# Patient Record
Sex: MALE | Race: WHITE | NOT HISPANIC OR LATINO | Employment: PART TIME | URBAN - METROPOLITAN AREA
[De-identification: names, ages, dates, MRNs, and addresses within clinical notes are randomized per-mention and may not be internally consistent; named-entity substitution may affect disease eponyms.]

---

## 2023-10-20 ENCOUNTER — TELEPHONE (OUTPATIENT)
Dept: PEDIATRIC NEUROLOGY | Facility: HOSPITAL | Age: 23
End: 2023-10-20
Payer: COMMERCIAL

## 2023-10-20 DIAGNOSIS — F90.2 ATTENTION DEFICIT HYPERACTIVITY DISORDER (ADHD), COMBINED TYPE: ICD-10-CM

## 2023-10-20 RX ORDER — METHYLPHENIDATE HYDROCHLORIDE 36 MG/1
72 TABLET ORAL DAILY
COMMUNITY
End: 2023-10-20 | Stop reason: SDUPTHER

## 2023-10-20 RX ORDER — METHYLPHENIDATE HYDROCHLORIDE 36 MG/1
72 TABLET ORAL DAILY
Qty: 60 TABLET | Refills: 0 | Status: SHIPPED | OUTPATIENT
Start: 2023-10-20 | End: 2024-01-23 | Stop reason: SDUPTHER

## 2023-10-20 RX ORDER — METHYLPHENIDATE HYDROCHLORIDE 36 MG/1
72 TABLET ORAL DAILY
Qty: 60 TABLET | Refills: 0 | Status: SHIPPED | OUTPATIENT
Start: 2023-12-21 | End: 2024-02-21 | Stop reason: SDUPTHER

## 2023-10-20 RX ORDER — METHYLPHENIDATE HYDROCHLORIDE 36 MG/1
72 TABLET ORAL DAILY
Qty: 60 TABLET | Refills: 0 | Status: SHIPPED | OUTPATIENT
Start: 2023-11-20 | End: 2024-02-21 | Stop reason: SDUPTHER

## 2023-10-20 NOTE — TELEPHONE ENCOUNTER
Rx Refill Request Telephone Encounter  Name:  John Chandler  :  [id number]  Medication Name:  Methylphenidate HCI ER  36 MG oral tab take 2 tabs daily   Specific Pharmacy location:  [address]   Date of last appointment:  2023

## 2023-12-02 DIAGNOSIS — F41.9 ANXIETY DISORDER, UNSPECIFIED: ICD-10-CM

## 2023-12-02 RX ORDER — ARIPIPRAZOLE 5 MG/1
5 TABLET ORAL DAILY
Qty: 90 TABLET | Refills: 1 | Status: SHIPPED | OUTPATIENT
Start: 2023-12-02 | End: 2024-05-28

## 2023-12-11 DIAGNOSIS — F90.9 ATTENTION-DEFICIT HYPERACTIVITY DISORDER, UNSPECIFIED TYPE: ICD-10-CM

## 2023-12-11 RX ORDER — GUANFACINE 2 MG/1
2 TABLET, EXTENDED RELEASE ORAL DAILY
Qty: 90 TABLET | Refills: 1 | Status: SHIPPED | OUTPATIENT
Start: 2023-12-11

## 2023-12-16 DIAGNOSIS — F41.9 ANXIETY DISORDER, UNSPECIFIED: ICD-10-CM

## 2023-12-18 RX ORDER — FLUOXETINE HYDROCHLORIDE 40 MG/1
40 CAPSULE ORAL DAILY
Qty: 90 CAPSULE | Refills: 1 | Status: SHIPPED | OUTPATIENT
Start: 2023-12-18 | End: 2024-04-15

## 2024-01-12 DIAGNOSIS — G47.9 SLEEP DISORDER, UNSPECIFIED: ICD-10-CM

## 2024-01-12 RX ORDER — CLONIDINE HYDROCHLORIDE 0.2 MG/1
0.2 TABLET ORAL NIGHTLY
Qty: 90 TABLET | Refills: 1 | Status: SHIPPED | OUTPATIENT
Start: 2024-01-12

## 2024-01-23 DIAGNOSIS — F90.2 ATTENTION DEFICIT HYPERACTIVITY DISORDER (ADHD), COMBINED TYPE: ICD-10-CM

## 2024-01-23 RX ORDER — METHYLPHENIDATE HYDROCHLORIDE 36 MG/1
72 TABLET ORAL DAILY
Qty: 60 TABLET | Refills: 0 | Status: SHIPPED | OUTPATIENT
Start: 2024-03-23 | End: 2024-02-21 | Stop reason: ENTERED-IN-ERROR

## 2024-01-23 RX ORDER — METHYLPHENIDATE HYDROCHLORIDE 36 MG/1
72 TABLET ORAL DAILY
Qty: 60 TABLET | Refills: 0 | Status: SHIPPED | OUTPATIENT
Start: 2024-01-23 | End: 2024-02-21 | Stop reason: WASHOUT

## 2024-01-23 RX ORDER — METHYLPHENIDATE HYDROCHLORIDE 36 MG/1
72 TABLET ORAL DAILY
Qty: 60 TABLET | Refills: 0 | Status: SHIPPED | OUTPATIENT
Start: 2024-02-23 | End: 2024-02-21 | Stop reason: ENTERED-IN-ERROR

## 2024-02-21 DIAGNOSIS — F90.2 ATTENTION DEFICIT HYPERACTIVITY DISORDER (ADHD), COMBINED TYPE: ICD-10-CM

## 2024-02-21 RX ORDER — METHYLPHENIDATE HYDROCHLORIDE 36 MG/1
72 TABLET ORAL DAILY
Qty: 60 TABLET | Refills: 0 | Status: SHIPPED | OUTPATIENT
Start: 2024-04-21 | End: 2024-05-21

## 2024-02-21 RX ORDER — METHYLPHENIDATE HYDROCHLORIDE 36 MG/1
72 TABLET ORAL DAILY
Qty: 60 TABLET | Refills: 0 | Status: SHIPPED | OUTPATIENT
Start: 2024-03-20 | End: 2024-04-19

## 2024-02-21 RX ORDER — METHYLPHENIDATE HYDROCHLORIDE 36 MG/1
72 TABLET ORAL DAILY
Qty: 60 TABLET | Refills: 0 | Status: SHIPPED | OUTPATIENT
Start: 2024-02-21 | End: 2024-05-23 | Stop reason: SDUPTHER

## 2024-04-13 DIAGNOSIS — F41.9 ANXIETY DISORDER, UNSPECIFIED: ICD-10-CM

## 2024-04-15 RX ORDER — FLUOXETINE HYDROCHLORIDE 40 MG/1
40 CAPSULE ORAL DAILY
Qty: 90 CAPSULE | Refills: 1 | Status: SHIPPED | OUTPATIENT
Start: 2024-04-15

## 2024-05-23 DIAGNOSIS — F90.2 ATTENTION DEFICIT HYPERACTIVITY DISORDER (ADHD), COMBINED TYPE: ICD-10-CM

## 2024-05-23 RX ORDER — METHYLPHENIDATE HYDROCHLORIDE 36 MG/1
72 TABLET ORAL DAILY
Qty: 60 TABLET | Refills: 0 | Status: SHIPPED | OUTPATIENT
Start: 2024-06-22 | End: 2024-07-22

## 2024-05-23 RX ORDER — METHYLPHENIDATE HYDROCHLORIDE 36 MG/1
72 TABLET ORAL DAILY
Qty: 60 TABLET | Refills: 0 | Status: SHIPPED | OUTPATIENT
Start: 2024-05-23 | End: 2024-06-22

## 2024-05-23 RX ORDER — METHYLPHENIDATE HYDROCHLORIDE 36 MG/1
72 TABLET ORAL DAILY
Qty: 60 TABLET | Refills: 0 | Status: SHIPPED | OUTPATIENT
Start: 2024-07-22 | End: 2024-08-21

## 2024-05-27 DIAGNOSIS — F41.9 ANXIETY DISORDER, UNSPECIFIED: ICD-10-CM

## 2024-05-28 RX ORDER — ARIPIPRAZOLE 5 MG/1
5 TABLET ORAL DAILY
Qty: 90 TABLET | Refills: 1 | Status: SHIPPED | OUTPATIENT
Start: 2024-05-28

## 2024-06-17 DIAGNOSIS — F90.9 ATTENTION-DEFICIT HYPERACTIVITY DISORDER, UNSPECIFIED TYPE: ICD-10-CM

## 2024-06-17 RX ORDER — GUANFACINE 2 MG/1
2 TABLET, EXTENDED RELEASE ORAL DAILY
Qty: 90 TABLET | Refills: 1 | Status: SHIPPED | OUTPATIENT
Start: 2024-06-17

## 2024-06-26 ENCOUNTER — APPOINTMENT (OUTPATIENT)
Dept: PEDIATRIC NEUROLOGY | Facility: CLINIC | Age: 24
End: 2024-06-26
Payer: COMMERCIAL

## 2024-06-26 DIAGNOSIS — F90.9 ATTENTION DEFICIT HYPERACTIVITY DISORDER (ADHD), UNSPECIFIED ADHD TYPE: ICD-10-CM

## 2024-06-26 DIAGNOSIS — F41.9 ANXIETY: Primary | ICD-10-CM

## 2024-06-26 DIAGNOSIS — G47.9 SLEEP DISTURBANCES: ICD-10-CM

## 2024-06-26 PROBLEM — G40.909 EPILEPSY (MULTI): Status: ACTIVE | Noted: 2024-06-26

## 2024-06-26 PROCEDURE — 99213 OFFICE O/P EST LOW 20 MIN: CPT | Performed by: PSYCHIATRY & NEUROLOGY

## 2024-06-26 PROCEDURE — 1036F TOBACCO NON-USER: CPT | Performed by: PSYCHIATRY & NEUROLOGY

## 2024-06-26 RX ORDER — ETHOSUXIMIDE 250 MG/1
CAPSULE ORAL
COMMUNITY
Start: 2024-06-17

## 2024-06-26 RX ORDER — DIVALPROEX SODIUM 500 MG/1
500 TABLET, DELAYED RELEASE ORAL 2 TIMES DAILY
COMMUNITY

## 2024-06-26 NOTE — PROGRESS NOTES
Subjective   John Chandler is a 23 y.o.   man with ADHD and anxiety.  HPI  John a 23-year-old young man with anxiety, ADHD, and intellectual challenges. He takes Concerta 72 mg every morning (missing the dose is associated with an obvious worsening of attention and silliness), guanfacine ER 2 mg qAM (higher dose worsened behaviors) and fluoxetine 40 mg every evening. He has picking behavior but no reported OCD behaviors. Abilify 5 mg qAM has reduced anger, mood swings, and threatening behaviors.  He hand his mother are pleased with the medication effect.     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 He works at [employer] on the [employer] line without a  (9 AM to 2 PM) and has been there for 9 months.     John participates in sports.  He says he is training to be on [employer].     John falls asleep with melatonin 10 mg and clonidine 0.2 mg at bedtime and sleeps through the night.  He may fall asleep after awakening but stays awake the rest of the day.     Review of Systems  12 point review of systems finds no pertinent positives except epilepsy with a generalized convulsion about 2 years ago. He remains on Depakote 500 mg bid and ethosuximide 500 mg bid. By report, EEG was abnormal in the past and had a recent vEEG. He has constipation that requires treatment..     Objective   Neurological Exam  Mental Status  Awake and alert.    Motor   No abnormal involuntary movements.    Physical Exam  Constitutional:       General: He is awake.   Neurological:      Mental Status: He is alert.   Psychiatric:         Mood and Affect: Mood normal.         Behavior: Behavior normal.       Assessment/Plan     John is doing well.  He is calmer and more focused.    No change in medication at  this time.  Call for renewals.  Discussed pharmacy options for methylphenidate ER 36 mg tabs.  Call about any changes.  At some time, can try to decrease/wean guanfacine ER to determine if it is still needed.  Follow up in 1 year.

## 2024-06-26 NOTE — LETTER
June 26, 2024     Hema Putnam MD  [address]  [address]  [facility] [address]    Patient: John Chandler   YOB: 2000   Date of Visit: 6/26/2024       Dear Dr. Hema Putnam MD:    Thank you for referring John Chandler to me for evaluation. Below are my notes for this consultation.  If you have questions, please do not hesitate to call me. I look forward to following your patient along with you.       Sincerely,     Karthik Rm MD      CC: No Recipients  ______________________________________________________________________________________    Subjective  John Chandler is a 23 y.o.   man with ADHD and anxiety.  HPI  John a 23-year-old young man with anxiety, ADHD, and intellectual challenges. He takes Concerta 72 mg every morning (missing the dose is associated with an obvious worsening of attention and silliness), guanfacine ER 2 mg qAM (higher dose worsened behaviors) and fluoxetine 40 mg every evening. He has picking behavior but no reported OCD behaviors. Abilify 5 mg qAM has reduced anger, mood swings, and threatening behaviors.  He hand his mother are pleased with the medication effect.     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     John participates in sports.  He says he is training to be on [employer].     John falls asleep with melatonin 10 mg and clonidine 0.2 mg at bedtime and sleeps through the night.  He may fall asleep after awakening but stays awake the rest of the day.     Review of Systems  12 point review of systems finds no pertinent positives except epilepsy with a generalized convulsion about 2 years ago. He remains on  Depakote 500 mg bid and ethosuximide 500 mg bid. By report, EEG was abnormal in the past and had a recent vEEG. He has constipation that requires treatment..     Objective  Neurological Exam  Mental Status  Awake and alert.    Motor   No abnormal involuntary movements.    Physical Exam  Constitutional:       General: He is awake.   Neurological:      Mental Status: He is alert.   Psychiatric:         Mood and Affect: Mood normal.         Behavior: Behavior normal.       Assessment/Plan    John is doing well.  He is calmer and more focused.    No change in medication at this time.  Call for renewals.  Discussed pharmacy options for methylphenidate ER 36 mg tabs.  Call about any changes.  At some time, can try to decrease/wean guanfacine ER to determine if it is still needed.  Follow up in 1 year.

## 2024-06-26 NOTE — PATIENT INSTRUCTIONS
John is doing well.  He is calmer and more focused.    No change in medication at this time.  Call for renewals.  Discussed pharmacy options for methylphenidate ER 36 mg tabs.  Call about any changes.  At some time, can try to decrease/wean guanfacine ER to determine if it is still needed.  Follow up in 1 year.

## 2024-07-12 DIAGNOSIS — G47.9 SLEEP DISORDER, UNSPECIFIED: ICD-10-CM

## 2024-07-12 RX ORDER — CLONIDINE HYDROCHLORIDE 0.2 MG/1
0.2 TABLET ORAL NIGHTLY
Qty: 90 TABLET | Refills: 1 | Status: SHIPPED | OUTPATIENT
Start: 2024-07-12

## 2024-08-22 DIAGNOSIS — F90.2 ATTENTION DEFICIT HYPERACTIVITY DISORDER (ADHD), COMBINED TYPE: ICD-10-CM

## 2024-08-22 RX ORDER — METHYLPHENIDATE HYDROCHLORIDE 36 MG/1
72 TABLET ORAL DAILY
Qty: 60 TABLET | Refills: 0 | Status: SHIPPED | OUTPATIENT
Start: 2024-08-22 | End: 2024-09-21

## 2024-08-22 RX ORDER — METHYLPHENIDATE HYDROCHLORIDE 36 MG/1
72 TABLET ORAL DAILY
Qty: 60 TABLET | Refills: 0 | Status: SHIPPED | OUTPATIENT
Start: 2024-10-21 | End: 2024-11-20

## 2024-08-22 RX ORDER — METHYLPHENIDATE HYDROCHLORIDE 36 MG/1
72 TABLET ORAL DAILY
Qty: 60 TABLET | Refills: 0 | Status: SHIPPED | OUTPATIENT
Start: 2024-09-21 | End: 2024-10-21

## 2024-09-28 ENCOUNTER — OFFICE VISIT (OUTPATIENT)
Dept: URGENT CARE | Age: 24
End: 2024-09-28
Payer: COMMERCIAL

## 2024-09-28 VITALS
OXYGEN SATURATION: 97 % | RESPIRATION RATE: 14 BRPM | SYSTOLIC BLOOD PRESSURE: 117 MMHG | HEART RATE: 71 BPM | DIASTOLIC BLOOD PRESSURE: 74 MMHG | TEMPERATURE: 98.1 F

## 2024-09-28 DIAGNOSIS — R60.0 EDEMA OF RIGHT LOWER LEG: Primary | ICD-10-CM

## 2024-09-28 ASSESSMENT — ENCOUNTER SYMPTOMS
MUSCULOSKELETAL PAIN: 1
MYALGIAS: 1

## 2024-09-28 NOTE — PROGRESS NOTES
Subjective   Patient ID: John Chandler is a 23 y.o. male. They present today with a chief complaint of Muscle Pain (Right calf pain X 5 days, swollen. TD-MA).    History of Present Illness    Muscle Pain  Associated symptoms: myalgias      Patient presents to urgent care for a chief complaint of right calf swelling and pain over the last 5 days no known inciting events, no history of DVT, no periods reported of prolonged travel calf is tender to palpation  Past Medical History  Allergies as of 09/28/2024    (No Known Allergies)       (Not in a hospital admission)       No past medical history on file.    No past surgical history on file.     reports that he has never smoked. He has never used smokeless tobacco. He reports that he does not drink alcohol and does not use drugs.    Review of Systems  Review of Systems   Musculoskeletal:  Positive for myalgias.   All other systems reviewed and are negative.                                 Objective    Vitals:    09/28/24 1148   BP: 117/74   Pulse: 71   Resp: 14   Temp: 36.7 °C (98.1 °F)   SpO2: 97%     No LMP for male patient.    Physical Exam  Vitals and nursing note reviewed.   Constitutional:       General: He is not in acute distress.     Appearance: Normal appearance. He is not ill-appearing, toxic-appearing or diaphoretic.   Musculoskeletal:      Comments: Upon examination there is the presence of edema of the right calf that is grossly noticeable, right calf measuring 45 cm in circumference while left measures 40, calf is tender to palpation, negative Homans' sign, I do not appreciate any increase in vasculature, both lower extremities warm to the touch, able to flex extend at knee range of motion is full, able to plantarflex dorsiflex and circumduct at the ankle, popliteal pulses palpable   Neurological:      General: No focal deficit present.      Mental Status: He is alert and oriented to person, place, and time.   Psychiatric:         Mood and Affect:  Mood normal.         Behavior: Behavior normal.         Procedures    Point of Care Test & Imaging Results from this visit  No results found for this visit on 09/28/24.   No results found.    Diagnostic study results (if any) were reviewed by [facility].    Assessment/Plan   Allergies, medications, history, and pertinent labs/EKGs/Imaging reviewed by Tony Aguiar PA-C.     Medical Decision Making  I did discuss with parents and patient as calf is 5 cm greater in circumference and no known injury I did recommend going to ER for further eval and imaging to rule out possible etiologies of DVT versus muscle strain.  Father verbalized understanding and is agreeable to plan patient will be taken to [facility] ED    Orders and Diagnoses  There are no diagnoses linked to this encounter.    Medical Admin Record      Patient disposition: ED    Electronically signed by [facility]  11:57 AM

## 2024-11-25 DIAGNOSIS — F90.2 ATTENTION DEFICIT HYPERACTIVITY DISORDER (ADHD), COMBINED TYPE: ICD-10-CM

## 2024-11-25 RX ORDER — METHYLPHENIDATE HYDROCHLORIDE 36 MG/1
72 TABLET ORAL DAILY
Qty: 60 TABLET | Refills: 0 | Status: SHIPPED | OUTPATIENT
Start: 2025-01-24 | End: 2025-02-23

## 2024-11-25 RX ORDER — METHYLPHENIDATE HYDROCHLORIDE 36 MG/1
72 TABLET ORAL DAILY
Qty: 60 TABLET | Refills: 0 | Status: SHIPPED | OUTPATIENT
Start: 2024-11-25 | End: 2024-12-25

## 2024-11-25 RX ORDER — METHYLPHENIDATE HYDROCHLORIDE 36 MG/1
72 TABLET ORAL DAILY
Qty: 60 TABLET | Refills: 0 | Status: SHIPPED | OUTPATIENT
Start: 2024-12-25 | End: 2025-01-24

## 2024-11-29 DIAGNOSIS — F41.9 ANXIETY DISORDER, UNSPECIFIED: ICD-10-CM

## 2024-12-02 RX ORDER — ARIPIPRAZOLE 5 MG/1
5 TABLET ORAL DAILY
Qty: 90 TABLET | Refills: 1 | Status: SHIPPED | OUTPATIENT
Start: 2024-12-02

## 2024-12-12 DIAGNOSIS — F90.9 ATTENTION-DEFICIT HYPERACTIVITY DISORDER, UNSPECIFIED TYPE: ICD-10-CM

## 2024-12-12 DIAGNOSIS — F41.9 ANXIETY DISORDER, UNSPECIFIED: ICD-10-CM

## 2024-12-12 RX ORDER — GUANFACINE 2 MG/1
2 TABLET, EXTENDED RELEASE ORAL DAILY
Qty: 90 TABLET | Refills: 1 | Status: SHIPPED | OUTPATIENT
Start: 2024-12-12

## 2024-12-12 RX ORDER — FLUOXETINE HYDROCHLORIDE 40 MG/1
40 CAPSULE ORAL DAILY
Qty: 90 CAPSULE | Refills: 1 | Status: SHIPPED | OUTPATIENT
Start: 2024-12-12

## 2025-01-05 DIAGNOSIS — G47.9 SLEEP DISORDER, UNSPECIFIED: ICD-10-CM

## 2025-01-06 RX ORDER — CLONIDINE HYDROCHLORIDE 0.2 MG/1
0.2 TABLET ORAL NIGHTLY
Qty: 90 TABLET | Refills: 1 | Status: SHIPPED | OUTPATIENT
Start: 2025-01-06

## 2025-02-10 ENCOUNTER — HOSPITAL ENCOUNTER (OUTPATIENT)
Dept: RADIOLOGY | Facility: CLINIC | Age: 25
Discharge: HOME | End: 2025-02-10
Payer: COMMERCIAL

## 2025-02-10 ENCOUNTER — APPOINTMENT (OUTPATIENT)
Dept: ORTHOPEDIC SURGERY | Facility: CLINIC | Age: 25
End: 2025-02-10
Payer: COMMERCIAL

## 2025-02-10 DIAGNOSIS — M25.561 ACUTE PAIN OF RIGHT KNEE: ICD-10-CM

## 2025-02-10 DIAGNOSIS — S89.91XA RIGHT KNEE INJURY, INITIAL ENCOUNTER: ICD-10-CM

## 2025-02-10 DIAGNOSIS — M25.561 ACUTE PAIN OF RIGHT KNEE: Primary | ICD-10-CM

## 2025-02-10 PROCEDURE — 1036F TOBACCO NON-USER: CPT | Performed by: FAMILY MEDICINE

## 2025-02-10 PROCEDURE — 73562 X-RAY EXAM OF KNEE 3: CPT | Mod: RT

## 2025-02-10 PROCEDURE — 99204 OFFICE O/P NEW MOD 45 MIN: CPT | Performed by: FAMILY MEDICINE

## 2025-02-10 NOTE — PROGRESS NOTES
History of Present Illness   Chief Complaint   Patient presents with    Right Knee - Pain       The patient is 24 y.o. male with history of epilepsy disorder, anxiety with his father today here with a complaint of right knee/leg injury.  Patient is able to provide some history though some details are a little obscure.  Initial onset of symptoms 2 days ago, says that he was playing in a basketball game, says he was coming down from taking a shot when he felt a pain in the back of his knee/proximal calf area.  He has been able to walk/bear weight but with some difficulty, there is been some swelling.  Patient does have a history of calf injury and September 2024, said that he had a few recurrent injuries in the next 2 months following this but had been asymptomatic for the past 6 weeks, initially thought he may have reaggravated his calf but this pain does feel different.  He denies any history of any knee problems in the past.  He feels slightly unstable with walking, no locking or catching.  He has been using over-the-counter medications.    No past medical history on file.    Medication Documentation Review Audit       Reviewed by Judith Celis MA (Medical Assistant) on 09/28/24 at 1149      Medication Order Taking? Sig Documenting Provider Last Dose Status   ARIPiprazole (Abilify) 5 mg tablet [id number]  TAKE 1 TABLET BY MOUTH EVERY DAY Karthik Rm MD  Active   cloNIDine (Catapres) 0.2 mg tablet [id number]  TAKE 1 TABLET BY MOUTH EVERYDAY AT BEDTIME Karthik Rm MD  Active   divalproex (Depakote) 500 mg EC tablet [id number]  Take 1 tablet (500 mg) by mouth 2 times a day. Historical Provider, MD  Active   ethosuximide (Zarontin) 250 mg capsule [id number]  Take 2 tablet by mouth twice daily (total= 1000mg/d) Historical Provider, MD  Active   FLUoxetine (PROzac) 40 mg capsule [id number]  TAKE 1 CAPSULE BY MOUTH EVERY DAY Karthik Rm MD  Active   guanFACINE (Intuniv) 2 mg ER 24 hr tablet [id number]  TAKE 1  TABLET BY MOUTH EVERY DAY Karthik Rm MD  Active   methylphenidate ER (Concerta) 36 mg daily tablet [id number]  Take 2 tablets (72 mg) by mouth once daily. Do not start before 2024. Karthik Rm MD  Active   methylphenidate ER 36 mg extended release tablet [id number]  Take 2 tablets (72 mg) by mouth once daily. Karthik Rm MD   24 235   methylphenidate ER 36 mg extended release tablet [id number]  Take 2 tablets (72 mg) by mouth once daily. Do not fill before 2024. Karthik Rm MD   24 235   methylphenidate ER 36 mg extended release tablet [id number]  Take 2 tablets (72 mg) by mouth once daily. Do not fill before 2024. Karthik Rm MD   24 235   methylphenidate ER 36 mg extended release tablet [id number]  Take 2 tablets (72 mg) by mouth once daily. Karthik Rm MD   24   methylphenidate ER 36 mg extended release tablet [id number]  Take 2 tablets (72 mg) by mouth once daily. Do not fill before 2024. Karthik Rm MD  Active   methylphenidate ER 36 mg extended release tablet [id number]  Take 2 tablets (72 mg) by mouth once daily. Do not fill before 2024. Karthik Rm MD  Active                    No Known Allergies    Social History     Socioeconomic History    Marital status: Single     Spouse name: Not on file    Number of children: Not on file    Years of education: Not on file    Highest education level: Not on file   Occupational History    Not on file   Tobacco Use    Smoking status: Never    Smokeless tobacco: Never   Substance and Sexual Activity    Alcohol use: Never    Drug use: Never    Sexual activity: Not on file   Other Topics Concern    Not on file   Social History Narrative    Not on file     Social Drivers of Health     Financial Resource Strain: Not on file   Food Insecurity: Not on file   Transportation Needs: Not on file   Physical Activity: Not on file   Stress: Not on file    Social Connections: Not on file   Intimate Partner Violence: Not on file   Housing Stability: Not on file       No past surgical history on file.       Review of Systems   GENERAL: Negative  GI: Negative  MUSCULOSKELETAL: See HPI  SKIN: Negative  NEURO:  Negative     Physical Exam:    General/Constitutional: well appearing, no distress, appears stated age  HEENT: sclera clear  Respiratory: non labored breathing  Vascular: No edema, swelling or tenderness, except as noted in detailed exam.  Integumentary: No impressive skin lesions present, except as noted in detailed exam.  Neurological:  Alert and oriented   Psychological:  Normal mood and affect.  Musculoskeletal: Normal, except as noted in detailed exam and in HPI antalgic gait, unassisted    Right knee: Normal appearance, no swelling, no skin changes.  There is a moderate joint effusion.  No areas of significant tenderness palpation on exam, there is no bony tenderness of the distal femur or proximal tibia, no medial or lateral joint line tenderness.  Range of motion from 3 to 115 degrees with pain at end range of flexion.  Extensor mechanism is intact, there is no significant weakness with resisted knee flexion or extension.  Negative Rafy, negative posterior drawer, there is increased laxity with Lachman maneuver and anterior drawer.  Stable to varus and valgus stress.    There is no swelling or tenderness in the gastrocnemius, Achilles tendon is intact.       Imaging: X-rays of right knee obtained today and independently reviewed, there is a joint effusion seen on lateral view, no acute fractures are identified, no significant degenerative changes are present      Assessment   1. Acute pain of right knee  XR knee right 3 views      2. Right knee injury, initial encounter              Plan: Acute right knee injury, exam findings, history concerning for ACL tear.  Discussed differential diagnosis, further workup and treatment.  I would like to obtain an  MRI of his right knee for further evaluation.  He will refrain from basketball, other high level of activity while awaiting MRI, he can use over-the-counter knee brace for support if needed.  He can use over-the-counter medications as needed for pain, he will ice, encouraged early active range of motion exercises of the knee.  Further treatment pending MRI results.

## 2025-02-24 ENCOUNTER — HOSPITAL ENCOUNTER (OUTPATIENT)
Dept: RADIOLOGY | Facility: CLINIC | Age: 25
Discharge: HOME | End: 2025-02-24
Payer: COMMERCIAL

## 2025-02-24 DIAGNOSIS — S89.91XA RIGHT KNEE INJURY, INITIAL ENCOUNTER: ICD-10-CM

## 2025-02-24 DIAGNOSIS — M25.561 ACUTE PAIN OF RIGHT KNEE: ICD-10-CM

## 2025-02-24 PROCEDURE — 73721 MRI JNT OF LWR EXTRE W/O DYE: CPT | Mod: RT

## 2025-02-24 PROCEDURE — 73721 MRI JNT OF LWR EXTRE W/O DYE: CPT | Mod: RIGHT SIDE | Performed by: STUDENT IN AN ORGANIZED HEALTH CARE EDUCATION/TRAINING PROGRAM

## 2025-02-27 DIAGNOSIS — F90.2 ATTENTION DEFICIT HYPERACTIVITY DISORDER (ADHD), COMBINED TYPE: ICD-10-CM

## 2025-02-27 RX ORDER — METHYLPHENIDATE HYDROCHLORIDE 36 MG/1
72 TABLET ORAL DAILY
Qty: 60 TABLET | Refills: 0 | Status: SHIPPED | OUTPATIENT
Start: 2025-04-28 | End: 2025-05-28

## 2025-02-27 RX ORDER — METHYLPHENIDATE HYDROCHLORIDE 36 MG/1
72 TABLET ORAL DAILY
Qty: 60 TABLET | Refills: 0 | Status: SHIPPED | OUTPATIENT
Start: 2025-02-27 | End: 2025-03-29

## 2025-02-27 RX ORDER — METHYLPHENIDATE HYDROCHLORIDE 36 MG/1
72 TABLET ORAL DAILY
Qty: 60 TABLET | Refills: 0 | Status: SHIPPED | OUTPATIENT
Start: 2025-03-29 | End: 2025-04-28

## 2025-03-03 ENCOUNTER — TELEPHONE (OUTPATIENT)
Dept: ORTHOPEDIC SURGERY | Facility: HOSPITAL | Age: 25
End: 2025-03-03
Payer: COMMERCIAL

## 2025-03-04 NOTE — TELEPHONE ENCOUNTER
Message received 3/4/25 .. Patient scheduled a follow up yesterday. DAVE    Plan  - OMFS consulted, appreciate input  - non-operative management  - outpatient follow-up as needed

## 2025-03-04 NOTE — TELEPHONE ENCOUNTER
Copied from CRM #[id number]. Topic: Needs Earlier Appointment  >> Feb 28, 2025  1:19 PM Juliane BATEMAN wrote:  I have the above patient Dad on my back line and he believes that Dr. Campo office called him in regards to his son John Chandler who was referred by Dr. Luis Daniel Monet, he's states that he has tried to call you back at the number tht ws left on voice mail but it ws incorrect, can someone please call him back at [phone], thank you he did not want me to try and schedule

## 2025-03-11 ENCOUNTER — OFFICE VISIT (OUTPATIENT)
Dept: ORTHOPEDIC SURGERY | Facility: CLINIC | Age: 25
End: 2025-03-11
Payer: COMMERCIAL

## 2025-03-11 VITALS — WEIGHT: 210 LBS | BODY MASS INDEX: 26.95 KG/M2 | HEIGHT: 74 IN

## 2025-03-11 DIAGNOSIS — S83.271A COMPLEX TEAR OF LATERAL MENISCUS OF RIGHT KNEE AS CURRENT INJURY, INITIAL ENCOUNTER: ICD-10-CM

## 2025-03-11 DIAGNOSIS — S83.511A ANTERIOR CRUCIATE LIGAMENT COMPLETE TEAR, RIGHT, INITIAL ENCOUNTER: Primary | ICD-10-CM

## 2025-03-11 DIAGNOSIS — S83.231A COMPLEX TEAR OF MEDIAL MENISCUS OF RIGHT KNEE AS CURRENT INJURY, INITIAL ENCOUNTER: ICD-10-CM

## 2025-03-11 PROCEDURE — 99214 OFFICE O/P EST MOD 30 MIN: CPT | Performed by: ORTHOPAEDIC SURGERY

## 2025-03-11 PROCEDURE — 1036F TOBACCO NON-USER: CPT | Performed by: ORTHOPAEDIC SURGERY

## 2025-03-11 PROCEDURE — 3008F BODY MASS INDEX DOCD: CPT | Performed by: ORTHOPAEDIC SURGERY

## 2025-03-11 PROCEDURE — 99204 OFFICE O/P NEW MOD 45 MIN: CPT | Performed by: ORTHOPAEDIC SURGERY

## 2025-03-11 RX ORDER — CEFAZOLIN SODIUM 2 G/100ML
2 INJECTION, SOLUTION INTRAVENOUS ONCE
OUTPATIENT
Start: 2025-03-11 | End: 2025-03-11

## 2025-03-11 NOTE — PROGRESS NOTES
24-year-old is seen with right knee pain.  He injured the right knee originally in September.  He has had several recurrent injuries since that time.  Most recently in early February playing basketball he landed from taking a shot and developed sharp severe pains in the knee.  He has been having swelling and giving out.  He has epilepsy and anxiety and intellectual challenges.  He is on multiple medications.  He has not had a seizure in at least 2 years.  He enjoys participating in sports including basketball and [employer] type training.    Pleasant in no acute distress.  Walks antalgic gait.  Right knee is a mild effusion and range of motion of 5 to 115 degrees.  There is positive Lachman.  Guards with pivot shift.  Negative posterior drawer.  No posterior lateral instability.  The knee stable to varus and valgus stress.  There is positive Rafy's with pain medially and laterally.  There is generalized tenderness.  Left knee range of motion is 0 to 135 degrees without effusion instability or tenderness.  Both lower extremities are well-perfused the skin is intact and muscle tone is adequate.    Multiple x-ray views of the right knee are personally reviewed and there is no acute bony abnormality.    MRI of the right knee is personally reviewed and there is a complete tear involving the anterior cruciate ligament.  There is tearing involving the posterior horn of the medial meniscus.  There is tearing involving the posterior horn of the lateral meniscus.  There are mild chondral changes in the medial compartment.  There is a popliteal cyst.  There is pivot shift bone bruise pattern.    A detailed discussion about the ACL tear was done with the patient and his parents.  Treatment options including no treatment were reviewed and the decision was made to proceed with right anterior cruciate ligament reconstruction with treatment of the medial and lateral meniscus as needed with either repair or partial  meniscectomy.  Surgery and postoperative course were discussed in detail.  Risks including but not limited to infection thromboembolus neurovascular injury medical problems stiffness graft failure or loosening or lack of healing of the meniscus repair and need for further surgery were discussed and they understand this and have elected to proceed.  To perform physical therapy for prehab.  Precautions reviewed.  He will have clearance from his neurologist and any recommendations on medication adjustments that might be necessary.  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  Reels expectations for return to work were also discussed.  He would benefit from a ACL brace.  The [employer] cryotherapy unit would also be helpful to reduce postoperative pain and swelling and reduce the need for narcotic pain medication postoperatively.

## 2025-03-13 DIAGNOSIS — S83.511D RUPTURE OF ANTERIOR CRUCIATE LIGAMENT OF RIGHT KNEE, SUBSEQUENT ENCOUNTER: ICD-10-CM

## 2025-03-13 PROBLEM — S83.511A ANTERIOR CRUCIATE LIGAMENT COMPLETE TEAR, RIGHT, INITIAL ENCOUNTER: Status: ACTIVE | Noted: 2025-03-11

## 2025-03-13 PROBLEM — S83.271A COMPLEX TEAR OF LATERAL MENISCUS OF RIGHT KNEE AS CURRENT INJURY: Status: ACTIVE | Noted: 2025-03-11

## 2025-03-13 PROBLEM — S83.209A CURRENT TEAR OF SEMILUNAR CARTILAGE: Status: ACTIVE | Noted: 2025-03-11

## 2025-03-24 NOTE — PROGRESS NOTES
Physical Therapy Evaluation and Treatment      Patient Name: John Chandler  MRN: [id number]  Today's Date: 3/25/2025  Time Calculation  Start Time: 0838  Stop Time: 0905  Time Calculation (min): 27 min    Therapy Diagnoses:    Problem List Items Addressed This Visit             ICD-10-CM    Anterior cruciate ligament complete tear, right, initial encounter - Primary S83.511A    Current tear of semilunar cartilage S83.209A    Relevant Orders    Follow Up In Physical Therapy    Follow Up In Physical Therapy    Complex tear of lateral meniscus of right knee as current injury S83.271A    Relevant Orders    Follow Up In Physical Therapy       Visit #: 1     Insurance:   Payor: ANTHEM / Plan: ANTHEM HMP / Product Type: *No Product type* / 20V PCY 0 USED NEEDS AUTH KAREN PAYS AT 80% AFTER DED 3500.00 2935.59 APPLIED OOP 6750.00 3221.26 APPLIED   Authorization required: yes  Authorized visits: 20  Authorized date range: -     General:  Reason for visit: R ACL tear with medial and lateral meniscus tear; prehab  Referred by: Mandeep Campo MD   Next MD appt:  04/14/25 - surgery     Precautions:  PMH: Epilepsy, anxiety, ADHD  Fall Risk: Low        Assessment:   John Chandler is a 24 y.o. year old male who participated in a physical therapy evaluation today due to complaint of R knee pain. History was provided primarily by patient with assistance of recollection of events provided by his mother. Patient reports pain/problem began 10/2024 when patient was training for [employer] when he had an awkward fall. Patient originally thought he had a calf sprain and kept training/playing sports, but pain was not improving she he went to ED a few days after the incident. According to patient's mother, they were told Dmitri likely sustained a calf strain and was recommended to take it easy for a little. Mother states every time Dmitri would resume activity, sx would return and be very limiting. Patient eventually  "consulted with Dr. Monet in 02/2025 where imaging revealed rupture of R ACL along with tear of medial and lateral meniscus. Patient consulted Dr. Campo in March of this year who recommended ACL and meniscus repair. Reports sx have remained the same for some time. The patient's findings are consistent with dx of R knee pain 2/2 dx of ACL rupture and meniscal tear. Their impairments include Pain, Active ROM, Strength, Activity limitations, Fall risk, Decreased functional level, and Participation restrictions. Tissue irritability level is low. Due to these impairments the patient is limited in performing  walking, stair navigation, standing, and playing sports . Activity limitations and participations restrictions include not being able to participate in his various sports like [employer], basketball, baseball, and lacrosse. John will benefit from continued skilled physical therapy as well as development of a home exercise program to address current impairments and progress towards return to their prior level of function without limitations.    Rehab Potential: Good    Clinical Presentation:  Stable and/or uncomplicated characteristics    Level of Complexity: Low      Subjective:  Reason For Visit: Initial Evaluation  - CC: Patient presents to PT with complaint of R knee pain 2/2 ACL rupture with medial and lateral meniscus tears  - DOI: 09/2024  - DOS: No surgery found  - GEORGI: Traumatic - fall  - IMAGING: MRI available R knee 2/24/25: \"IMPRESSION:  ACL rupture from femoral attachment with pivot-shift bone contusions.      Medial and lateral posterior meniscocapsular junction high-grade  strains.      Leaking popliteal cyst\"    - HX: History was provided primarily by patient, but further details were provided by his mother who accompanied Dmitri today. Patient reports pain/problem began 10/2024 when patient was training for [employer] when he had an awkward fall. Patient originally thought he " "had a calf sprain and kept training/playing sports, but pain was not improving she he went to ED a few days after the incident. According to patient's mother, they were told Dmitri likely sustained a calf strain and was recommended to take it easy for a little. Mother states every time Dmitri would resume activity, sx would return and be very limiting. Patient eventually consulted with Dr. Monet in 02/2025 where imaging revealed rupture of R ACL along with tear of medial and lateral meniscus. Patient consulted Dr. Campo in March of this year who recommended ACL and meniscus repair. Reports sx have remained the same for some time.   - PAIN: CURRENT: (0/10); BEST: (0/10); WORST: (0/10); LOCATION: ( R knee); Description: \"it hurt\"  - ALLEVIATES PAIN: rest  - EXACERBATES PAIN: walking      - PLOF: Patient reports no functional limitations prior to injury.   - Previous Interventions/Treatments: None  - FUNCTIONAL LIMITATIONS:  walking, stair navigation, standing, and playing sports  - LIVING ENVIRONMENT: lives at home with parents  - WORK:  does not work  - EXERCISE: American ninja warrior, baseball, basketball, lacrosse  - PATIENT'S GOAL:  return to playing sports      Objective:     Gait Assessment - mild antalgic gait with slight decreased in R LE stance time and lacking full TKE    AROM  Right knee flexion: WNL   Right knee extension: 0 degrees     MMT  Right quadriceps: 4    Left quadriceps: 5      Special Tests    Lachman R (+)      Outcome Measures  Other Measures  Lower Extremity Funtional Score (LEFS): 37        Goals:  Short-term goals: (6-12 weeks)  Full pain free ROM  - Quad strength > or = 75% of the uninvolved LE  - Indep normal gait.  - reciprocal navigation of stairs with good eccentric strength     Long-term goals: 6-12 months  1.) Independent with HEP to maintain improvements in functional capacity and promote return to PLOF.  2.) Quad strength >/= 90% of uninvolved LE  3.) Hop test >/= 90% of uninvolved " LE    Treatment:   1) Initial evaluation - Low complexity (  minutes)   2) Patient educated on POC, HEP, and current condition.   3) Treatment Performed:    Therapeutic Exercise:    15 min  Quad set with heel prop x10 5 sec holds  SLR x10  SL hip ABD x10  Extensive education provided to patient and mother on post-operative precautions, expectations for rehabilitation, timeline for return to sport, and importance of safe and consistent performance of exercises at home to facilitate return to PLOF    4) HEP Provided (See Below)   Access Code: [email or web address]  URL: [email or web address]  Date: 03/25/2025  Prepared by: Kranthi Zhang    Exercises  - Long Sitting Quad Set with Towel Roll Under Heel  - 7 x weekly - 5-10 sets - 100 reps - 5-10 hold  - Active Straight Leg Raise with Quad Set  - 1-3 x daily - 7 x weekly - 3 sets - 10 reps  - Sidelying Hip Abduction  - 1-3 x daily - 7 x weekly - 3 sets - 10 reps  - Ice  - 5 x daily - 7 x weekly - 10 minutes hold    Plan: Progress R quad strength post-operatively per patient tolerance. Utilize biofeedback and NMES as able. Potentially BFR, but may not be appropriate for patient.     Recommended Treatment:  Therapeutic exercise, Manual therapy, Gait training, Home program instruction and progression, Neuromuscular re-education, Therapeutic activities, Self care and home management, Instruction in activity modification, Electrical stimulation, Vasopneumatic device + cold, Cryotherapy, and Dry Needling    Recommended Visits: 1-2x/wk for (20 visits)     Patient in agreement with POC.    Kranthi Zhang, PT

## 2025-03-25 ENCOUNTER — EVALUATION (OUTPATIENT)
Dept: PHYSICAL THERAPY | Facility: CLINIC | Age: 25
End: 2025-03-25
Payer: COMMERCIAL

## 2025-03-25 DIAGNOSIS — S83.231D COMPLEX TEAR OF MEDIAL MENISCUS OF RIGHT KNEE AS CURRENT INJURY, SUBSEQUENT ENCOUNTER: ICD-10-CM

## 2025-03-25 DIAGNOSIS — S83.511D RUPTURE OF ANTERIOR CRUCIATE LIGAMENT OF RIGHT KNEE, SUBSEQUENT ENCOUNTER: Primary | ICD-10-CM

## 2025-03-25 DIAGNOSIS — S83.271D COMPLEX TEAR OF LATERAL MENISCUS OF RIGHT KNEE AS CURRENT INJURY, SUBSEQUENT ENCOUNTER: ICD-10-CM

## 2025-03-25 PROCEDURE — 97161 PT EVAL LOW COMPLEX 20 MIN: CPT | Mod: GP

## 2025-03-25 PROCEDURE — 97110 THERAPEUTIC EXERCISES: CPT | Mod: GP

## 2025-03-25 ASSESSMENT — PATIENT HEALTH QUESTIONNAIRE - PHQ9
2. FEELING DOWN, DEPRESSED OR HOPELESS: NOT AT ALL
1. LITTLE INTEREST OR PLEASURE IN DOING THINGS: NOT AT ALL
SUM OF ALL RESPONSES TO PHQ9 QUESTIONS 1 AND 2: 0

## 2025-04-03 RX ORDER — CHLORHEXIDINE GLUCONATE 40 MG/ML
SOLUTION TOPICAL DAILY
Qty: 118 ML | Refills: 0 | Status: SHIPPED | OUTPATIENT
Start: 2025-04-03 | End: 2025-04-08

## 2025-04-03 RX ORDER — CHLORHEXIDINE GLUCONATE ORAL RINSE 1.2 MG/ML
15 SOLUTION DENTAL AS NEEDED
Qty: 120 ML | Refills: 0 | Status: SHIPPED | OUTPATIENT
Start: 2025-04-03

## 2025-04-04 ENCOUNTER — PRE-ADMISSION TESTING (OUTPATIENT)
Dept: PREADMISSION TESTING | Facility: HOSPITAL | Age: 25
End: 2025-04-04
Payer: COMMERCIAL

## 2025-04-04 VITALS
OXYGEN SATURATION: 99 % | BODY MASS INDEX: 27.25 KG/M2 | RESPIRATION RATE: 20 BRPM | DIASTOLIC BLOOD PRESSURE: 87 MMHG | HEART RATE: 74 BPM | TEMPERATURE: 96.4 F | HEIGHT: 74 IN | SYSTOLIC BLOOD PRESSURE: 141 MMHG | WEIGHT: 212.3 LBS

## 2025-04-04 DIAGNOSIS — Z01.818 PRE-OP TESTING: Primary | ICD-10-CM

## 2025-04-04 LAB
ANION GAP SERPL CALC-SCNC: 11 MMOL/L (ref 10–20)
BUN SERPL-MCNC: 14 MG/DL (ref 6–23)
CALCIUM SERPL-MCNC: 10.2 MG/DL (ref 8.6–10.3)
CHLORIDE SERPL-SCNC: 101 MMOL/L (ref 98–107)
CO2 SERPL-SCNC: 32 MMOL/L (ref 21–32)
CREAT SERPL-MCNC: 1.11 MG/DL (ref 0.5–1.3)
EGFRCR SERPLBLD CKD-EPI 2021: >90 ML/MIN/1.73M*2
GLUCOSE SERPL-MCNC: 89 MG/DL (ref 74–99)
POTASSIUM SERPL-SCNC: 4.1 MMOL/L (ref 3.5–5.3)
SODIUM SERPL-SCNC: 140 MMOL/L (ref 136–145)
VALPROATE SERPL-MCNC: 68 UG/ML (ref 50–100)

## 2025-04-04 PROCEDURE — 99204 OFFICE O/P NEW MOD 45 MIN: CPT | Performed by: NURSE PRACTITIONER

## 2025-04-04 PROCEDURE — 80048 BASIC METABOLIC PNL TOTAL CA: CPT

## 2025-04-04 PROCEDURE — 80164 ASSAY DIPROPYLACETIC ACD TOT: CPT

## 2025-04-04 PROCEDURE — 36415 COLL VENOUS BLD VENIPUNCTURE: CPT

## 2025-04-04 PROCEDURE — 87081 CULTURE SCREEN ONLY: CPT | Mod: PARLAB

## 2025-04-04 ASSESSMENT — PAIN SCALES - GENERAL: PAINLEVEL_OUTOF10: 0 - NO PAIN

## 2025-04-04 ASSESSMENT — DUKE ACTIVITY SCORE INDEX (DASI)
CAN YOU TAKE CARE OF YOURSELF (EAT, DRESS, BATHE, OR USE TOILET): YES
CAN YOU PARTICIPATE IN STRENOUS SPORTS LIKE SWIMMING, SINGLES TENNIS, FOOTBALL, BASKETBALL, OR SKIING: NO
CAN YOU WALK A BLOCK OR TWO ON LEVEL GROUND: YES
CAN YOU DO YARD WORK LIKE RAKING LEAVES, WEEDING OR PUSHING A MOWER: NO
CAN YOU DO MODERATE WORK AROUND THE HOUSE LIKE VACUUMING, SWEEPING FLOORS OR CARRYING GROCERIES: YES
CAN YOU CLIMB A FLIGHT OF STAIRS OR WALK UP A HILL: YES
TOTAL_SCORE: 18.95
CAN YOU RUN A SHORT DISTANCE: NO
CAN YOU PARTICIPATE IN MODERATE RECREATIONAL ACTIVITIES LIKE GOLF, BOWLING, DANCING, DOUBLES TENNIS OR THROWING A BASEBALL OR FOOTBALL: NO
DASI METS SCORE: 5.1
CAN YOU DO HEAVY WORK AROUND THE HOUSE LIKE SCRUBBING FLOORS OR LIFTING AND MOVING HEAVY FURNITURE: NO
CAN YOU WALK INDOORS, SUCH AS AROUND YOUR HOUSE: YES
CAN YOU HAVE SEXUAL RELATIONS: NO
CAN YOU DO LIGHT WORK AROUND THE HOUSE LIKE DUSTING OR WASHING DISHES: YES

## 2025-04-04 ASSESSMENT — PAIN - FUNCTIONAL ASSESSMENT: PAIN_FUNCTIONAL_ASSESSMENT: 0-10

## 2025-04-04 NOTE — CPM/PAT H&P
CPM/PAT Evaluation       Name: John Chandler (John Chandler)  /Age: 2000/24 y.o.     In-Person       24 yr old male presents to PAT for pre-operative evaluation, with c/o RIGHT KNEE ARTHROSCOIC ANTERIOR CRUCIATE LIGAMENT REPAIR WITH C-ARM; RIGHT KNEE ARTHROSCOPIC MEDIAL AND LATERAL MENISCUS REPAIR scheduled on 2025 with Dr. Mandeep Campo.    The patient has the following past medical history: epilepsy, anxiety, ADHD    PCP: Dr. Hema Putnam    Chief complaint:  10/2024 injury of R knee while training at home for [employer].  Pt states he landed on it and pain began right away.  [place]berry audible 'pop' at the time.  States that pain is intermittent and anterior.  Father states that pt limps and has slight swelling.  Full ROM.  Denies using meds for pain.  Uses ice at home.        Denies fever, chills or nausea.   Denies any past issues with anesthesia.  This is first surgery for patient.          Vitals:    25 0757   BP: 141/87   Pulse: 74   Resp: 20   Temp: 35.8 °C (96.4 °F)   SpO2: 99%          Past Medical History:   Diagnosis Date    ADHD (attention deficit hyperactivity disorder)     Anxiety     Autism ([facility])     Seizure disorder ([facility])        History reviewed. No pertinent surgical history.    Patient  has no history on file for sexual activity.    No family history on file.    No Known Allergies    Prior to Admission medications    Medication Sig Start Date End Date Taking? Authorizing Provider   ARIPiprazole (Abilify) 5 mg tablet TAKE 1 TABLET BY MOUTH EVERY DAY 24   Karthik Rm MD   chlorhexidine (Hibiclens) 4 % external liquid Apply topically once daily for 5 days. Begin using CHG for a total of 5 days before surgery Day 5 is the day of surgery See directions from the hospital 4/3/25 4/8/25  MALA Gamboa-CNP   chlorhexidine (Peridex) 0.12 % solution Use 15 mL in the mouth or throat if needed for wound care (oral rinse the night before surgery and  the morning on the day of surgery) for up to 2 doses. Swish in mouth and spit out 4/3/25   MALA Gamboa-CNP   cloNIDine (Catapres) 0.2 mg tablet TAKE 1 TABLET BY MOUTH EVERYDAY AT BEDTIME 1/6/25   Karthik Rm MD   divalproex (Depakote) 500 mg EC tablet Take 1 tablet (500 mg) by mouth 2 times a day.    Historical Provider, MD   ethosuximide (Zarontin) 250 mg capsule Take 2 tablet by mouth twice daily (total= 1000mg/d) 6/17/24   Historical Provider, MD   FLUoxetine (PROzac) 40 mg capsule TAKE 1 CAPSULE BY MOUTH EVERY DAY 12/12/24   Karthik Rm MD   guanFACINE (Intuniv) 2 mg ER 24 hr tablet TAKE 1 TABLET BY MOUTH EVERY DAY 12/12/24   Karthik Rm MD   methylphenidate ER 36 mg extended release tablet Take 2 tablets (72 mg) by mouth once daily. Do not fill before June 22, 2024.  Patient not taking: Reported on 4/4/2025 6/22/24 7/22/24  Karthik Rm MD   methylphenidate ER 36 mg extended release tablet Take 2 tablets (72 mg) by mouth once daily. Do not fill before July 22, 2024. 7/22/24 8/21/24  Karthik Rm MD   methylphenidate ER 36 mg extended release tablet Take 2 tablets (72 mg) by mouth once daily. 8/22/24 9/21/24  Karthik Rm MD   methylphenidate ER 36 mg extended release tablet Take 2 tablets (72 mg) by mouth once daily. Do not fill before September 21, 2024. 9/21/24 10/21/24  Karthik Rm MD   methylphenidate ER 36 mg extended release tablet Take 2 tablets (72 mg) by mouth once daily. Do not fill before October 21, 2024. 10/21/24 11/20/24  Karthik Rm MD   methylphenidate ER 36 mg extended release tablet Take 2 tablets (72 mg) by mouth once daily. 11/25/24 12/25/24  Karthik Rm MD   methylphenidate ER 36 mg extended release tablet Take 2 tablets (72 mg) by mouth once daily. Do not fill before December 25, 2024. 12/25/24 1/24/25  Karthik Rm MD   methylphenidate ER 36 mg extended release tablet Take 2 tablets (72 mg) by mouth once daily. Do not fill before January 24, 2025.  1/24/25 2/23/25  Karthik Rm MD   methylphenidate ER 36 mg extended release tablet Take 2 tablets (72 mg) by mouth once daily. 2/27/25 3/29/25  Karthik Rm MD   methylphenidate ER 36 mg extended release tablet Take 2 tablets (72 mg) by mouth once daily. Do not fill before March 29, 2025. 3/29/25 4/28/25  Karthik Rm MD   methylphenidate ER 36 mg extended release tablet Take 2 tablets (72 mg) by mouth once daily. Do not fill before April 28, 2025. 4/28/25 5/28/25  Karthik Rm MD        Review of Systems  Constitutional: NO F, chills, or sweats  Eyes: no blurred vision or visual disturbance  ENT: denies congestion, sore throat, difficulty hearing  Cardiovascular: no chest pain, no edema, no palps and no syncope.   Respiratory: no cough,no s.o.b. and no wheezing  Gastrointestinal: no abdominal pain, no N/V, no blood in stools  Genitourinary: no dysuria, no urinary frequency, no urinary hesitancy and no feelings of urinary urgency. Nocturia x1.  Musculoskeletal: no arthralgias,  no back pain and no myalgias.  See HPI.  Integumentary: no new skin lesions.  Endorses red patches of 'eczema' under R arm and is using OTC Cortisone.    Neurological: no headache, no limb weakness, no numbness and no tingling.   Endocrine: Father states that pt has gained 10 lbs due to injury decreasing activity level and no recent weight loss.   Hematologic/Lymphatic: no tendency for easy bruising and no swollen glands.    Physical Exam  Vitals reviewed.   Cardiovascular:      Rate and Rhythm: Normal rate and regular rhythm.   Pulmonary:      Effort: Pulmonary effort is normal.      Breath sounds: Normal breath sounds.   Abdominal:      General: Bowel sounds are normal.      Palpations: Abdomen is soft.   Musculoskeletal: R knee slightly swollen  Skin:     General: Skin is warm and dry. Two slightly raised, circular reddened areas noted under R arm.    Neurological:      Mental Status: She is oriented to person, place, and time.     "  PAT AIRWAY:   Airway:     Mallampati::  IV    TM distance::  >3 FB    Neck ROM::  Full      Visit Vitals  /87   Pulse 74   Temp 35.8 °C (96.4 °F) (Temporal)   Resp 20   Ht 1.88 m (6' 2\")   Wt 96.3 kg (212 lb 4.9 oz)   SpO2 99%   BMI 27.26 kg/m²   Smoking Status Never   BSA 2.24 m²       DASI Risk Score      Flowsheet Row Pre-Admission Testing [facility]   Can you take care of yourself (eat, dress, bathe, or use toilet)?  2.75 filed at 04/04/2025 0802   Can you walk indoors, such as around your house? 1.75 filed at 04/04/2025 0802   Can you walk a block or two on level ground?  2.75 filed at 04/04/2025 0802   Can you climb a flight of stairs or walk up a hill? 5.5 filed at 04/04/2025 0802   Can you run a short distance? 0 filed at 04/04/2025 0802   Can you do light work around the house like dusting or washing dishes? 2.7 filed at 04/04/2025 0802   Can you do moderate work around the house like vacuuming, sweeping floors or carrying groceries? 3.5 filed at 04/04/2025 0802   Can you do heavy work around the house like scrubbing floors or lifting and moving heavy furniture?  0 filed at 04/04/2025 0802   Can you do yard work like raking leaves, weeding or pushing a mower? 0 filed at 04/04/2025 0802   Can you have sexual relations? 0 filed at 04/04/2025 0802   Can you participate in moderate recreational activities like golf, bowling, dancing, doubles tennis or throwing a baseball or football? 0 filed at 04/04/2025 0802   Can you participate in strenous sports like swimming, singles tennis, football, basketball, or skiing? 0 filed at 04/04/2025 0802   DASI SCORE 18.95 filed at 04/04/2025 0802   METS Score (Will be calculated only when all the questions are answered) 5.1 filed at 04/04/2025 0802          Caprini DVT Assessment    No data to display       Modified Frailty Index    No data to display       [id number] Stroke Risk Points  Current as of 29 minutes ago        N/A 0 to 9 " Points:      Last Change: N/A          The [id number] risk score (Lip GH, et al. 2009. © 2010 American [employer]) quantifies the risk of stroke for a patient with atrial fibrillation. For patients without atrial fibrillation or under the age of 18 this score appears as N/A. Higher score values generally indicate higher risk of stroke.        This score is not applicable to this patient. Components are not calculated.          Revised Cardiac Risk Index    No data to display       Apfel Simplified Score    No data to display       Risk Analysis Index Results This Encounter    No data found in the last 10 encounters.       Stop Bang Score      Flowsheet Row Pre-Admission Testing [facility]/4/[facility] in [facility]   Do you snore loudly? 0 filed at 04/04/2025 0802   Do you often feel tired or fatigued after your sleep? 0 filed at 04/04/2025 0802   Has anyone ever observed you stop breathing in your sleep? 0 filed at 04/04/2025 0802   Do you have or are you being treated for high blood pressure? 0 filed at 04/04/2025 0802   Recent BMI (Calculated) 27.3 filed at 04/04/2025 0802   Is BMI greater than 35 kg/m2? 0=No filed at 04/04/2025 0802   Age older than 50 years old? 0=No filed at 04/04/2025 0802   Is your neck circumference greater than 17 inches (Male) or 16 inches (Female)? 0 filed at 04/04/2025 0802   Gender - Male 1=Yes filed at 04/04/2025 0802   STOP-BANG Total Score 1 filed at 04/04/2025 0802          Prodigy: High Risk  Total Score: 8              Prodigy Gender Score          ARISCAT Score for Postoperative Pulmonary Complications    No data to display       Pepito Perioperative Risk for Myocardial Infarction or Cardiac Arrest (LEANDRO)    No data to display           I was present with the APRN student who participated in the documentation of this note. I have personally seen and re-examined the patient and performed the medical decision-making components (assessment,  plan of care,  pre-op teaching, and holding medications). I have reviewed the APRN student documentation and verified the findings in the note as written with additions or exceptions as stated in the body of this note.      ASSESSMENT    Right knee strain/ACL tear  C/o right posterior knee pain since 2/2024 after playing basketball  Presented to  9/2024 for RLE pain. Testing negative for DVT  + antalgic gait, + edema to right knee, denies ROM limitations  Plan for right ACL repair as scheduled    Anxiety/ASD  Takes Abilify, Catapress, Fluoxetine    ADHD/ epilepsy  Takes Depakote, Ethosuximide, Intuniv  Follows Pediatric neurology  Last seizure 2 years ago      Physical Exam   Chest   Chest comments: + maculopapular rash x 2  + erythema  + urticaria  Approx 1-1.5 round shaped  - for drainage           ANESTHESIA FINDINGS:  Intubation History: No history of difficult intubation  Significant Anesthesia Considerations: no history of adult general anesthesia     Airway History: No abnormal airway history    CONSULTS:    Patient does not require consults for optimization at this time.     The Following Tests/Procedures Have Been Initiated and Reviewed/ interpreted by me:   BMP, Valproic Acid level,  MRSA screen     Planned Anesthetic: general     Instructions Given to Patient:  *Reviewed Medications to be taken in AM of surgery or held  Patient given verbal and written preop instructions and voices comprehension and compliance.    Discussed following up with PCP re treatment for Tinea corporis/ rash to right chest  Currently treating with OTC steroid cream---> discussed need for anti-fungal    No further testing required.      PLAN  This patient is optimally prepared for surgery.

## 2025-04-04 NOTE — H&P (VIEW-ONLY)
CPM/PAT Evaluation       Name: John Chandler (John Chandler)  /Age: 2000/24 y.o.     In-Person       24 yr old male presents to PAT for pre-operative evaluation, with c/o RIGHT KNEE ARTHROSCOIC ANTERIOR CRUCIATE LIGAMENT REPAIR WITH C-ARM; RIGHT KNEE ARTHROSCOPIC MEDIAL AND LATERAL MENISCUS REPAIR scheduled on 2025 with Dr. Mandeep Campo.    The patient has the following past medical history: epilepsy, anxiety, ADHD    PCP: Dr. Hema Putnam    Chief complaint:  10/2024 injury of R knee while training at home for [employer].  Pt states he landed on it and pain began right away.  [place] audible 'pop' at the time.  States that pain is intermittent and anterior.  Father states that pt limps and has slight swelling.  Full ROM.  Denies using meds for pain.  Uses ice at home.        Denies fever, chills or nausea.   Denies any past issues with anesthesia.  This is first surgery for patient.          Vitals:    25 0757   BP: 141/87   Pulse: 74   Resp: 20   Temp: 35.8 °C (96.4 °F)   SpO2: 99%          Past Medical History:   Diagnosis Date    ADHD (attention deficit hyperactivity disorder)     Anxiety     Autism ([facility])     Seizure disorder ([facility])        History reviewed. No pertinent surgical history.    Patient  has no history on file for sexual activity.    No family history on file.    No Known Allergies    Prior to Admission medications    Medication Sig Start Date End Date Taking? Authorizing Provider   ARIPiprazole (Abilify) 5 mg tablet TAKE 1 TABLET BY MOUTH EVERY DAY 24   Karthik Rm MD   chlorhexidine (Hibiclens) 4 % external liquid Apply topically once daily for 5 days. Begin using CHG for a total of 5 days before surgery Day 5 is the day of surgery See directions from the hospital 4/3/25 4/8/25  MALA Gamboa-CNP   chlorhexidine (Peridex) 0.12 % solution Use 15 mL in the mouth or throat if needed for wound care (oral rinse the night before surgery and  the morning on the day of surgery) for up to 2 doses. Swish in mouth and spit out 4/3/25   MALA Gamboa-CNP   cloNIDine (Catapres) 0.2 mg tablet TAKE 1 TABLET BY MOUTH EVERYDAY AT BEDTIME 1/6/25   Karthik Rm MD   divalproex (Depakote) 500 mg EC tablet Take 1 tablet (500 mg) by mouth 2 times a day.    Historical Provider, MD   ethosuximide (Zarontin) 250 mg capsule Take 2 tablet by mouth twice daily (total= 1000mg/d) 6/17/24   Historical Provider, MD   FLUoxetine (PROzac) 40 mg capsule TAKE 1 CAPSULE BY MOUTH EVERY DAY 12/12/24   Karthik Rm MD   guanFACINE (Intuniv) 2 mg ER 24 hr tablet TAKE 1 TABLET BY MOUTH EVERY DAY 12/12/24   Karthik Rm MD   methylphenidate ER 36 mg extended release tablet Take 2 tablets (72 mg) by mouth once daily. Do not fill before June 22, 2024.  Patient not taking: Reported on 4/4/2025 6/22/24 7/22/24  Karthik Rm MD   methylphenidate ER 36 mg extended release tablet Take 2 tablets (72 mg) by mouth once daily. Do not fill before July 22, 2024. 7/22/24 8/21/24  Karthik Rm MD   methylphenidate ER 36 mg extended release tablet Take 2 tablets (72 mg) by mouth once daily. 8/22/24 9/21/24  Karthik Rm MD   methylphenidate ER 36 mg extended release tablet Take 2 tablets (72 mg) by mouth once daily. Do not fill before September 21, 2024. 9/21/24 10/21/24  Karthik Rm MD   methylphenidate ER 36 mg extended release tablet Take 2 tablets (72 mg) by mouth once daily. Do not fill before October 21, 2024. 10/21/24 11/20/24  Karthik Rm MD   methylphenidate ER 36 mg extended release tablet Take 2 tablets (72 mg) by mouth once daily. 11/25/24 12/25/24  Karthik Rm MD   methylphenidate ER 36 mg extended release tablet Take 2 tablets (72 mg) by mouth once daily. Do not fill before December 25, 2024. 12/25/24 1/24/25  Karthik Rm MD   methylphenidate ER 36 mg extended release tablet Take 2 tablets (72 mg) by mouth once daily. Do not fill before January 24, 2025.  1/24/25 2/23/25  Karthik Rm MD   methylphenidate ER 36 mg extended release tablet Take 2 tablets (72 mg) by mouth once daily. 2/27/25 3/29/25  Karthik Rm MD   methylphenidate ER 36 mg extended release tablet Take 2 tablets (72 mg) by mouth once daily. Do not fill before March 29, 2025. 3/29/25 4/28/25  Karthik Rm MD   methylphenidate ER 36 mg extended release tablet Take 2 tablets (72 mg) by mouth once daily. Do not fill before April 28, 2025. 4/28/25 5/28/25  Karthik Rm MD        Review of Systems  Constitutional: NO F, chills, or sweats  Eyes: no blurred vision or visual disturbance  ENT: denies congestion, sore throat, difficulty hearing  Cardiovascular: no chest pain, no edema, no palps and no syncope.   Respiratory: no cough,no s.o.b. and no wheezing  Gastrointestinal: no abdominal pain, no N/V, no blood in stools  Genitourinary: no dysuria, no urinary frequency, no urinary hesitancy and no feelings of urinary urgency. Nocturia x1.  Musculoskeletal: no arthralgias,  no back pain and no myalgias.  See HPI.  Integumentary: no new skin lesions.  Endorses red patches of 'eczema' under R arm and is using OTC Cortisone.    Neurological: no headache, no limb weakness, no numbness and no tingling.   Endocrine: Father states that pt has gained 10 lbs due to injury decreasing activity level and no recent weight loss.   Hematologic/Lymphatic: no tendency for easy bruising and no swollen glands.    Physical Exam  Vitals reviewed.   Cardiovascular:      Rate and Rhythm: Normal rate and regular rhythm.   Pulmonary:      Effort: Pulmonary effort is normal.      Breath sounds: Normal breath sounds.   Abdominal:      General: Bowel sounds are normal.      Palpations: Abdomen is soft.   Musculoskeletal: R knee slightly swollen  Skin:     General: Skin is warm and dry. Two slightly raised, circular reddened areas noted under R arm.    Neurological:      Mental Status: She is oriented to person, place, and time.     "  PAT AIRWAY:   Airway:     Mallampati::  IV    TM distance::  >3 FB    Neck ROM::  Full      Visit Vitals  /87   Pulse 74   Temp 35.8 °C (96.4 °F) (Temporal)   Resp 20   Ht 1.88 m (6' 2\")   Wt 96.3 kg (212 lb 4.9 oz)   SpO2 99%   BMI 27.26 kg/m²   Smoking Status Never   BSA 2.24 m²       DASI Risk Score      Flowsheet Row Pre-Admission Testing [facility]/4/[facility]25 in [facility]   Can you take care of yourself (eat, dress, bathe, or use toilet)?  2.75 filed at 04/04/2025 0802   Can you walk indoors, such as around your house? 1.75 filed at 04/04/2025 0802   Can you walk a block or two on level ground?  2.75 filed at 04/04/2025 0802   Can you climb a flight of stairs or walk up a hill? 5.5 filed at 04/04/2025 0802   Can you run a short distance? 0 filed at 04/04/2025 0802   Can you do light work around the house like dusting or washing dishes? 2.7 filed at 04/04/2025 0802   Can you do moderate work around the house like vacuuming, sweeping floors or carrying groceries? 3.5 filed at 04/04/2025 0802   Can you do heavy work around the house like scrubbing floors or lifting and moving heavy furniture?  0 filed at 04/04/2025 0802   Can you do yard work like raking leaves, weeding or pushing a mower? 0 filed at 04/04/2025 0802   Can you have sexual relations? 0 filed at 04/04/2025 0802   Can you participate in moderate recreational activities like golf, bowling, dancing, doubles tennis or throwing a baseball or football? 0 filed at 04/04/2025 0802   Can you participate in strenous sports like swimming, singles tennis, football, basketball, or skiing? 0 filed at 04/04/2025 0802   DASI SCORE 18.95 filed at 04/04/2025 0802   METS Score (Will be calculated only when all the questions are answered) 5.1 filed at 04/04/2025 0802          Caprini DVT Assessment    No data to display       Modified Frailty Index    No data to display       [id number] Stroke Risk Points  Current as of 29 minutes ago        N/A 0 to 9 " Points:      Last Change: N/A          The TWR1FE1-OHMr risk score (Lip GH, et al. 2009. © 2010 American [employer]) quantifies the risk of stroke for a patient with atrial fibrillation. For patients without atrial fibrillation or under the age of 18 this score appears as N/A. Higher score values generally indicate higher risk of stroke.        This score is not applicable to this patient. Components are not calculated.          Revised Cardiac Risk Index    No data to display       Apfel Simplified Score    No data to display       Risk Analysis Index Results This Encounter    No data found in the last 10 encounters.       Stop Bang Score      Flowsheet Row Pre-Admission Testing from 4/4/2025 in [facility]   Do you snore loudly? 0 filed at 04/04/2025 0802   Do you often feel tired or fatigued after your sleep? 0 filed at 04/04/2025 0802   Has anyone ever observed you stop breathing in your sleep? 0 filed at 04/04/2025 0802   Do you have or are you being treated for high blood pressure? 0 filed at 04/04/2025 0802   Recent BMI (Calculated) 27.3 filed at 04/04/2025 0802   Is BMI greater than 35 kg/m2? 0=No filed at 04/04/2025 0802   Age older than 50 years old? 0=No filed at 04/04/2025 0802   Is your neck circumference greater than 17 inches (Male) or 16 inches (Female)? 0 filed at 04/04/2025 0802   Gender - Male 1=Yes filed at 04/04/2025 0802   STOP-BANG Total Score 1 filed at 04/04/2025 0802          Prodigy: High Risk  Total Score: 8              Prodigy Gender Score          ARISCAT Score for Postoperative Pulmonary Complications    No data to display       Pepito Perioperative Risk for Myocardial Infarction or Cardiac Arrest (LEANDRO)    No data to display           I was present with the APRN student who participated in the documentation of this note. I have personally seen and re-examined the patient and performed the medical decision-making components (assessment,  plan of care,  pre-op teaching, and holding medications). I have reviewed the APRN student documentation and verified the findings in the note as written with additions or exceptions as stated in the body of this note.      ASSESSMENT    Right knee strain/ACL tear  C/o right posterior knee pain since 2/2024 after playing basketball  Presented to  9/2024 for RLE pain. Testing negative for DVT  + antalgic gait, + edema to right knee, denies ROM limitations  Plan for right ACL repair as scheduled    Anxiety/ASD  Takes Abilify, Catapress, Fluoxetine    ADHD/ epilepsy  Takes Depakote, Ethosuximide, Intuniv  Follows Pediatric neurology  Last seizure 2 years ago      Physical Exam   Chest   Chest comments: + maculopapular rash x 2  + erythema  + urticaria  Approx 1-1.5 round shaped  - for drainage           ANESTHESIA FINDINGS:  Intubation History: No history of difficult intubation  Significant Anesthesia Considerations: no history of adult general anesthesia     Airway History: No abnormal airway history    CONSULTS:    Patient does not require consults for optimization at this time.     The Following Tests/Procedures Have Been Initiated and Reviewed/ interpreted by me:   BMP, Valproic Acid level,  MRSA screen     Planned Anesthetic: general     Instructions Given to Patient:  *Reviewed Medications to be taken in AM of surgery or held  Patient given verbal and written preop instructions and voices comprehension and compliance.    Discussed following up with PCP re treatment for Tinea corporis/ rash to right chest  Currently treating with OTC steroid cream---> discussed need for anti-fungal    No further testing required.      PLAN  This patient is optimally prepared for surgery.

## 2025-04-04 NOTE — PREPROCEDURE INSTRUCTIONS
Medication List            Accurate as of April 4, 2025  8:40 AM. Always use your most recent med list.                ARIPiprazole 5 mg tablet  Commonly known as: Abilify  TAKE 1 TABLET BY MOUTH EVERY DAY  Medication Adjustments for Surgery: Take on the morning of surgery     * chlorhexidine 0.12 % solution  Commonly known as: Peridex  Use 15 mL in the mouth or throat if needed for wound care (oral rinse the night before surgery and the morning on the day of surgery) for up to 2 doses. Swish in mouth and spit out  Medication Adjustments for Surgery: Take/Use as prescribed     * chlorhexidine 4 % external liquid  Commonly known as: Hibiclens  Apply topically once daily for 5 days. Begin using CHG for a total of 5 days before surgery Day 5 is the day of surgery See directions from the hospital  Medication Adjustments for Surgery: Take/Use as prescribed     cloNIDine 0.2 mg tablet  Commonly known as: Catapres  TAKE 1 TABLET BY MOUTH EVERYDAY AT BEDTIME  Medication Adjustments for Surgery: Take/Use as prescribed     divalproex 500 mg EC tablet  Commonly known as: Depakote  Medication Adjustments for Surgery: Take on the morning of surgery     ethosuximide 250 mg capsule  Commonly known as: Zarontin  Medication Adjustments for Surgery: Take on the morning of surgery     FLUoxetine 40 mg capsule  Commonly known as: PROzac  TAKE 1 CAPSULE BY MOUTH EVERY DAY  Medication Adjustments for Surgery: Take/Use as prescribed     guanFACINE 2 mg ER 24 hr tablet  Commonly known as: Intuniv  TAKE 1 TABLET BY MOUTH EVERY DAY  Medication Adjustments for Surgery: Take on the morning of surgery     * methylphenidate ER 36 mg extended release tablet  Take 2 tablets (72 mg) by mouth once daily. Do not fill before June 22, 2024.  Medication Adjustments for Surgery: Do Not take on the morning of surgery     * methylphenidate ER 36 mg extended release tablet  Take 2 tablets (72 mg) by mouth once daily. Do not fill before July 22,  2024.  Medication Adjustments for Surgery: Do Not take on the morning of surgery     * methylphenidate ER 36 mg extended release tablet  Take 2 tablets (72 mg) by mouth once daily.  Medication Adjustments for Surgery: Do Not take on the morning of surgery     * methylphenidate ER 36 mg extended release tablet  Take 2 tablets (72 mg) by mouth once daily. Do not fill before September 21, 2024.  Medication Adjustments for Surgery: Do Not take on the morning of surgery     * methylphenidate ER 36 mg extended release tablet  Take 2 tablets (72 mg) by mouth once daily. Do not fill before October 21, 2024.  Medication Adjustments for Surgery: Do Not take on the morning of surgery     * methylphenidate ER 36 mg extended release tablet  Take 2 tablets (72 mg) by mouth once daily.  Medication Adjustments for Surgery: Do Not take on the morning of surgery     * methylphenidate ER 36 mg extended release tablet  Take 2 tablets (72 mg) by mouth once daily. Do not fill before December 25, 2024.  Medication Adjustments for Surgery: Do Not take on the morning of surgery     * methylphenidate ER 36 mg extended release tablet  Take 2 tablets (72 mg) by mouth once daily. Do not fill before January 24, 2025.  Medication Adjustments for Surgery: Do Not take on the morning of surgery     * methylphenidate ER 36 mg extended release tablet  Take 2 tablets (72 mg) by mouth once daily.  Medication Adjustments for Surgery: Do Not take on the morning of surgery     * methylphenidate ER 36 mg extended release tablet  Take 2 tablets (72 mg) by mouth once daily. Do not fill before March 29, 2025.  Medication Adjustments for Surgery: Do Not take on the morning of surgery     * methylphenidate ER 36 mg extended release tablet  Take 2 tablets (72 mg) by mouth once daily. Do not fill before April 28, 2025.  Start taking on: April 28, 2025  Medication Adjustments for Surgery: Do Not take on the morning of surgery           * This list has 13  medication(s) that are the same as other medications prescribed for you. Read the directions carefully, and ask your doctor or other care provider to review them with you.                                  NPO Instructions:    Do not eat any food after midnight the night before your surgery/procedure.    Additional Instructions:     Day of Surgery:  Review your medication instructions, take indicated medications  Wear  comfortable loose fitting clothing  Do not use moisturizers, creams, lotions or perfume              PRE-OPERATIVE INSTRUCTIONS FOR SURGERY    *Do not eat anything after midnight the night of surgery.  This includes food of any kind (including hard candy, cough drops, mints).   You may have up to 13 ounces of clear liquid  until TWO hours prior to your scheduled surgery time, unless ERAS* protocol is ordered for you.  Clear liquids include water, black tea/coffee, (no milk or cream) apple juice and electrolyte drinks (GATORADE).  You may chew gum until TWO hours prior you your surgery/procedure.     *ERAS protocol: follow as instructed.  DO not drink an additional 13 ounces as noted above.        *One of our staff members will call you ONE business day before your surgery, between 11 am-2 pm to let you know the time to arrive.  If you have not received a call by 2 pm, call [phone]    *When you arrive at the hospital-->GO TO Registration on the ground floor  *Stop smoking 24 hours prior to surgery.  No Marijuana, CBD Oil or Vaping for 48 hours  *No alcohol 24 hours prior to surgery  *You will need a responsible adult to drive you home  -No acrylic nails or nail polish on at least one fingernail, NO polish on toes for foot surgery  -You may be asked to remove your dentures, partial plate, eyeglasses or contact lenses before going to surgery.  Please bring a case for these items.  -Body piercings need to be removed.  Jewelry and valuables should be left at home.  -Put on loose,  comfortable, clean  clothing, that will accommodate bandages        What is a home antibacterial shower?  This shower is a way of cleaning the skin with a germ killing solution before surgery.  The solution contains chlorhexidine, commonly known as CHG.  CHG is a skin cleanser with germ killing ability.  Let your doctor know if you are allergic to chlorhexidine.    Why do I need to take a preoperative antibacterial shower?  Skin is not sterile.  It is best to try to make your skin as free of germs as possible before surgery.  Proper cleansing with a germ killing soap before surgery can lower the number of germs on your skin.  This helps to reduce the risk of infection at the surgical site.  Following the instructions listed below will help you prepare your skin for surgery.      How do I use the solution?    Steps: Begin using your CHG soap 5 days before your surgery on __________________.    *First, wash and rinse your hair using the CHG soap.  Keep CHG soap away from ear canals and eyes.   Rinse completely, do not condition.  Hair extensions should be removed.    *Wash your face with your normal soap and rinse.   *Apply the CHG solution to a clean wet washcloth.  Turn the water off or move away from the water spray to avoid premature rinsing of the CHG soap as you are applying.  Firmly lather your entire body from the neck down.  Do not use on your face.    *Pay special attention to the area(s) where your incision(s) will be located unless they are on your face.  Avoid scrubbing your skin too hard.  The important part is to have the CHG soap sit on your skin for 3 minutes.   *When the 3 minutes are up, turn on the water and rinse the CHG solution off your body completely.  *Do not wash with regular soap after you  have  used the CHG soap solution.  *Pat  yourself dry with a clean, freshly laundered towel.  *Do not apply powders, deodorants or lotions.  *Dress in clean freshly laundered night clothes.    *Be sure to sleep with clean  freshly laundered sheets.    *Be aware the CHG will cause stains on fabrics; if you wash them with bleach after use.  Rinse your washcloth and other linens that have contact with CHG completely.  Use only non-chlorine detergents to launder the items  used.  *The morning of surgery is the fifth day.  Repeat the above steps and dress in clean comfortable clothing.     What is oral/dental rinse?  It is mouthwash.  It is a way of cleaning the he mouth with a germ-killing solution before your surgery.  The solution contains chlorhexidine, commonly known as CHG.  It is used inside the mouth to kill a bacteria known as Staphylococcus aureus.  Let your doctor know if you are allergic to Chlorhexidine.    Why do I need to use CHG oral/ dental rinse?  The CHG oral/dental rinse helps to kill bacteria in your mouth know as Staphylococcus aureus.  This reduces the risk of infection at the surgical site.    Using your CHG oral/dental rinse    STEPS:    Use your CHG oral/dental rinse after you brush your teeth the night before (at bedtime) and the morning of your surgery.  Follow all the directions on your prescription label.  *Use the cap on the container to measure 15 ml  *Swish (gargle if you can) the mouthwash in your mouth for at least 30 seconds, (do not swallow) and spit out  *After you use your CHG rinse, do not rinse your mouth with water, drink or eat.  Please refer to the prescription label for the appropriate time to resume oral intake.    What side effects might I have using the CHG oral/dental rinse?  CHG rinse will stick you plaque on the teeth.  Brush and floss just before use.   Teeth brushing will help avoid staining of the plaque during  use.  Teeth brushing will help avoid staining of plaque during  use.      *If you have any further questions about your pre-op instructions,  not mentioned in this handout, then call [phone]*    What you may be asked to bring to surgery:  _x__Crutches, walker  ___CPAP  machine  ___Urine specimen

## 2025-04-05 LAB — STAPHYLOCOCCUS SPEC CULT: ABNORMAL

## 2025-04-14 ENCOUNTER — PHARMACY VISIT (OUTPATIENT)
Dept: PHARMACY | Facility: CLINIC | Age: 25
End: 2025-04-14
Payer: MEDICARE

## 2025-04-14 ENCOUNTER — ANESTHESIA EVENT (OUTPATIENT)
Dept: OPERATING ROOM | Facility: HOSPITAL | Age: 25
End: 2025-04-14
Payer: COMMERCIAL

## 2025-04-14 ENCOUNTER — HOSPITAL ENCOUNTER (OUTPATIENT)
Facility: HOSPITAL | Age: 25
Setting detail: OUTPATIENT SURGERY
Discharge: HOME | End: 2025-04-14
Attending: ORTHOPAEDIC SURGERY | Admitting: ORTHOPAEDIC SURGERY
Payer: COMMERCIAL

## 2025-04-14 ENCOUNTER — APPOINTMENT (OUTPATIENT)
Dept: RADIOLOGY | Facility: HOSPITAL | Age: 25
End: 2025-04-14
Payer: COMMERCIAL

## 2025-04-14 ENCOUNTER — ANESTHESIA (OUTPATIENT)
Dept: OPERATING ROOM | Facility: HOSPITAL | Age: 25
End: 2025-04-14
Payer: COMMERCIAL

## 2025-04-14 VITALS
OXYGEN SATURATION: 97 % | WEIGHT: 212.3 LBS | TEMPERATURE: 97.3 F | SYSTOLIC BLOOD PRESSURE: 116 MMHG | DIASTOLIC BLOOD PRESSURE: 70 MMHG | HEART RATE: 77 BPM | BODY MASS INDEX: 27.26 KG/M2 | RESPIRATION RATE: 16 BRPM

## 2025-04-14 DIAGNOSIS — S83.511A ANTERIOR CRUCIATE LIGAMENT COMPLETE TEAR, RIGHT, INITIAL ENCOUNTER: Primary | ICD-10-CM

## 2025-04-14 PROCEDURE — 3700000002 HC GENERAL ANESTHESIA TIME - EACH INCREMENTAL 1 MINUTE: Performed by: ORTHOPAEDIC SURGERY

## 2025-04-14 PROCEDURE — 29881 ARTHRS KNE SRG MNISECTMY M/L: CPT | Performed by: ORTHOPAEDIC SURGERY

## 2025-04-14 PROCEDURE — 2780000003 HC OR 278 NO HCPCS: Performed by: ORTHOPAEDIC SURGERY

## 2025-04-14 PROCEDURE — 2500000005 HC RX 250 GENERAL PHARMACY W/O HCPCS: Performed by: ORTHOPAEDIC SURGERY

## 2025-04-14 PROCEDURE — 2500000004 HC RX 250 GENERAL PHARMACY W/ HCPCS (ALT 636 FOR OP/ED)

## 2025-04-14 PROCEDURE — RXMED WILLOW AMBULATORY MEDICATION CHARGE

## 2025-04-14 PROCEDURE — 76000 FLUOROSCOPY <1 HR PHYS/QHP: CPT | Mod: 59

## 2025-04-14 PROCEDURE — 97161 PT EVAL LOW COMPLEX 20 MIN: CPT | Mod: GP

## 2025-04-14 PROCEDURE — 2500000004 HC RX 250 GENERAL PHARMACY W/ HCPCS (ALT 636 FOR OP/ED): Performed by: ORTHOPAEDIC SURGERY

## 2025-04-14 PROCEDURE — 3600000004 HC OR TIME - INITIAL BASE CHARGE - PROCEDURE LEVEL FOUR: Performed by: ORTHOPAEDIC SURGERY

## 2025-04-14 PROCEDURE — 3600000009 HC OR TIME - EACH INCREMENTAL 1 MINUTE - PROCEDURE LEVEL FOUR: Performed by: ORTHOPAEDIC SURGERY

## 2025-04-14 PROCEDURE — C1713 ANCHOR/SCREW BN/BN,TIS/BN: HCPCS | Performed by: ORTHOPAEDIC SURGERY

## 2025-04-14 PROCEDURE — 2500000001 HC RX 250 WO HCPCS SELF ADMINISTERED DRUGS (ALT 637 FOR MEDICARE OP): Performed by: ANESTHESIOLOGY

## 2025-04-14 PROCEDURE — 7100000010 HC PHASE TWO TIME - EACH INCREMENTAL 1 MINUTE: Performed by: ORTHOPAEDIC SURGERY

## 2025-04-14 PROCEDURE — 2720000007 HC OR 272 NO HCPCS: Performed by: ORTHOPAEDIC SURGERY

## 2025-04-14 PROCEDURE — 3700000001 HC GENERAL ANESTHESIA TIME - INITIAL BASE CHARGE: Performed by: ORTHOPAEDIC SURGERY

## 2025-04-14 PROCEDURE — 2500000004 HC RX 250 GENERAL PHARMACY W/ HCPCS (ALT 636 FOR OP/ED): Performed by: NURSE ANESTHETIST, CERTIFIED REGISTERED

## 2025-04-14 PROCEDURE — 29888 ARTHRS AID ACL RPR/AGMNTJ: CPT | Performed by: ORTHOPAEDIC SURGERY

## 2025-04-14 PROCEDURE — A29888 PR KNEE SCOPE,AID ANT CRUCIATE REPAIR: Performed by: NURSE ANESTHETIST, CERTIFIED REGISTERED

## 2025-04-14 PROCEDURE — 7100000002 HC RECOVERY ROOM TIME - EACH INCREMENTAL 1 MINUTE: Performed by: ORTHOPAEDIC SURGERY

## 2025-04-14 PROCEDURE — 7100000009 HC PHASE TWO TIME - INITIAL BASE CHARGE: Performed by: ORTHOPAEDIC SURGERY

## 2025-04-14 PROCEDURE — 7100000001 HC RECOVERY ROOM TIME - INITIAL BASE CHARGE: Performed by: ORTHOPAEDIC SURGERY

## 2025-04-14 PROCEDURE — A29888 PR KNEE SCOPE,AID ANT CRUCIATE REPAIR: Performed by: ANESTHESIOLOGY

## 2025-04-14 DEVICE — Ø10X 30MM BC IF SCRW, VENTED
Type: IMPLANTABLE DEVICE | Site: KNEE | Status: FUNCTIONAL
Brand: ARTHREX®

## 2025-04-14 DEVICE — IMPLANT, TIGHTROPE II RT, RECON IB, W/FIBERTAPE: Type: IMPLANTABLE DEVICE | Site: KNEE | Status: FUNCTIONAL

## 2025-04-14 DEVICE — SCREW, CANCELLOUS, LOW PROFILE
Type: IMPLANTABLE DEVICE | Site: KNEE | Status: FUNCTIONAL
Brand: ARTHREX®

## 2025-04-14 RX ORDER — ALBUTEROL SULFATE 0.83 MG/ML
2.5 SOLUTION RESPIRATORY (INHALATION) ONCE AS NEEDED
Status: DISCONTINUED | OUTPATIENT
Start: 2025-04-14 | End: 2025-04-14 | Stop reason: HOSPADM

## 2025-04-14 RX ORDER — MIDAZOLAM HYDROCHLORIDE 1 MG/ML
1 INJECTION, SOLUTION INTRAMUSCULAR; INTRAVENOUS ONCE AS NEEDED
Status: DISCONTINUED | OUTPATIENT
Start: 2025-04-14 | End: 2025-04-14 | Stop reason: HOSPADM

## 2025-04-14 RX ORDER — LABETALOL HYDROCHLORIDE 5 MG/ML
10 INJECTION, SOLUTION INTRAVENOUS ONCE AS NEEDED
Status: DISCONTINUED | OUTPATIENT
Start: 2025-04-14 | End: 2025-04-14 | Stop reason: HOSPADM

## 2025-04-14 RX ORDER — LIDOCAINE HYDROCHLORIDE 20 MG/ML
INJECTION, SOLUTION EPIDURAL; INFILTRATION; INTRACAUDAL; PERINEURAL AS NEEDED
Status: DISCONTINUED | OUTPATIENT
Start: 2025-04-14 | End: 2025-04-14

## 2025-04-14 RX ORDER — ONDANSETRON HYDROCHLORIDE 2 MG/ML
INJECTION, SOLUTION INTRAVENOUS AS NEEDED
Status: DISCONTINUED | OUTPATIENT
Start: 2025-04-14 | End: 2025-04-14

## 2025-04-14 RX ORDER — PROPOFOL 10 MG/ML
INJECTION, EMULSION INTRAVENOUS AS NEEDED
Status: DISCONTINUED | OUTPATIENT
Start: 2025-04-14 | End: 2025-04-14

## 2025-04-14 RX ORDER — ONDANSETRON 4 MG/1
4 TABLET, FILM COATED ORAL EVERY 8 HOURS PRN
Qty: 10 TABLET | Refills: 0 | Status: SHIPPED | OUTPATIENT
Start: 2025-04-14

## 2025-04-14 RX ORDER — DEXMEDETOMIDINE HYDROCHLORIDE 100 UG/ML
INJECTION, SOLUTION INTRAVENOUS AS NEEDED
Status: DISCONTINUED | OUTPATIENT
Start: 2025-04-14 | End: 2025-04-14

## 2025-04-14 RX ORDER — PHENYLEPHRINE HCL IN 0.9% NACL 1 MG/10 ML
SYRINGE (ML) INTRAVENOUS AS NEEDED
Status: DISCONTINUED | OUTPATIENT
Start: 2025-04-14 | End: 2025-04-14

## 2025-04-14 RX ORDER — SODIUM CHLORIDE, SODIUM LACTATE, POTASSIUM CHLORIDE, CALCIUM CHLORIDE 600; 310; 30; 20 MG/100ML; MG/100ML; MG/100ML; MG/100ML
100 INJECTION, SOLUTION INTRAVENOUS CONTINUOUS
Status: DISCONTINUED | OUTPATIENT
Start: 2025-04-14 | End: 2025-04-14 | Stop reason: HOSPADM

## 2025-04-14 RX ORDER — SCOPOLAMINE 1 MG/3D
1 PATCH, EXTENDED RELEASE TRANSDERMAL ONCE
Status: DISCONTINUED | OUTPATIENT
Start: 2025-04-14 | End: 2025-04-14 | Stop reason: HOSPADM

## 2025-04-14 RX ORDER — ONDANSETRON HYDROCHLORIDE 2 MG/ML
4 INJECTION, SOLUTION INTRAVENOUS ONCE AS NEEDED
Status: DISCONTINUED | OUTPATIENT
Start: 2025-04-14 | End: 2025-04-14 | Stop reason: HOSPADM

## 2025-04-14 RX ORDER — MEPERIDINE HYDROCHLORIDE 50 MG/ML
12.5 INJECTION INTRAMUSCULAR; INTRAVENOUS; SUBCUTANEOUS EVERY 10 MIN PRN
Status: DISCONTINUED | OUTPATIENT
Start: 2025-04-14 | End: 2025-04-14 | Stop reason: HOSPADM

## 2025-04-14 RX ORDER — ACETAMINOPHEN 325 MG/1
975 TABLET ORAL ONCE
Status: COMPLETED | OUTPATIENT
Start: 2025-04-14 | End: 2025-04-14

## 2025-04-14 RX ORDER — OXYCODONE HYDROCHLORIDE 5 MG/1
5 TABLET ORAL EVERY 4 HOURS PRN
Qty: 25 TABLET | Refills: 0 | Status: SHIPPED | OUTPATIENT
Start: 2025-04-14 | End: 2025-04-21

## 2025-04-14 RX ORDER — CEFAZOLIN SODIUM 2 G/100ML
2 INJECTION, SOLUTION INTRAVENOUS ONCE
Status: COMPLETED | OUTPATIENT
Start: 2025-04-14 | End: 2025-04-14

## 2025-04-14 RX ORDER — DIPHENHYDRAMINE HYDROCHLORIDE 50 MG/ML
25 INJECTION, SOLUTION INTRAMUSCULAR; INTRAVENOUS ONCE AS NEEDED
Status: DISCONTINUED | OUTPATIENT
Start: 2025-04-14 | End: 2025-04-14 | Stop reason: HOSPADM

## 2025-04-14 RX ORDER — FENTANYL CITRATE 50 UG/ML
INJECTION, SOLUTION INTRAMUSCULAR; INTRAVENOUS AS NEEDED
Status: DISCONTINUED | OUTPATIENT
Start: 2025-04-14 | End: 2025-04-14

## 2025-04-14 RX ORDER — MORPHINE SULFATE 2 MG/ML
2 INJECTION, SOLUTION INTRAMUSCULAR; INTRAVENOUS EVERY 5 MIN PRN
Status: DISCONTINUED | OUTPATIENT
Start: 2025-04-14 | End: 2025-04-14 | Stop reason: HOSPADM

## 2025-04-14 RX ORDER — FAMOTIDINE 10 MG/ML
20 INJECTION, SOLUTION INTRAVENOUS ONCE
Status: DISCONTINUED | OUTPATIENT
Start: 2025-04-14 | End: 2025-04-14 | Stop reason: HOSPADM

## 2025-04-14 RX ORDER — KETOROLAC TROMETHAMINE 30 MG/ML
INJECTION, SOLUTION INTRAMUSCULAR; INTRAVENOUS AS NEEDED
Status: DISCONTINUED | OUTPATIENT
Start: 2025-04-14 | End: 2025-04-14

## 2025-04-14 RX ORDER — BUPIVACAINE HCL/EPINEPHRINE 0.25-.0005
VIAL (ML) INJECTION AS NEEDED
Status: DISCONTINUED | OUTPATIENT
Start: 2025-04-14 | End: 2025-04-14 | Stop reason: HOSPADM

## 2025-04-14 RX ORDER — MIDAZOLAM HYDROCHLORIDE 1 MG/ML
INJECTION, SOLUTION INTRAMUSCULAR; INTRAVENOUS AS NEEDED
Status: DISCONTINUED | OUTPATIENT
Start: 2025-04-14 | End: 2025-04-14

## 2025-04-14 RX ORDER — BUPIVACAINE HYDROCHLORIDE 2.5 MG/ML
INJECTION, SOLUTION INFILTRATION; PERINEURAL AS NEEDED
Status: DISCONTINUED | OUTPATIENT
Start: 2025-04-14 | End: 2025-04-14 | Stop reason: HOSPADM

## 2025-04-14 RX ORDER — IBUPROFEN 600 MG/1
600 TABLET ORAL EVERY 6 HOURS PRN
Qty: 60 TABLET | Refills: 0 | Status: SHIPPED | OUTPATIENT
Start: 2025-04-14 | End: 2025-04-29

## 2025-04-14 RX ORDER — SODIUM CHLORIDE 0.9 G/100ML
INJECTION, SOLUTION IRRIGATION AS NEEDED
Status: DISCONTINUED | OUTPATIENT
Start: 2025-04-14 | End: 2025-04-14 | Stop reason: HOSPADM

## 2025-04-14 RX ORDER — HYDROMORPHONE HYDROCHLORIDE 1 MG/ML
1 INJECTION, SOLUTION INTRAMUSCULAR; INTRAVENOUS; SUBCUTANEOUS EVERY 5 MIN PRN
Status: DISCONTINUED | OUTPATIENT
Start: 2025-04-14 | End: 2025-04-14 | Stop reason: HOSPADM

## 2025-04-14 RX ORDER — METOCLOPRAMIDE HYDROCHLORIDE 5 MG/ML
10 INJECTION INTRAMUSCULAR; INTRAVENOUS ONCE AS NEEDED
Status: DISCONTINUED | OUTPATIENT
Start: 2025-04-14 | End: 2025-04-14 | Stop reason: HOSPADM

## 2025-04-14 RX ORDER — HYDRALAZINE HYDROCHLORIDE 20 MG/ML
10 INJECTION INTRAMUSCULAR; INTRAVENOUS EVERY 30 MIN PRN
Status: DISCONTINUED | OUTPATIENT
Start: 2025-04-14 | End: 2025-04-14 | Stop reason: HOSPADM

## 2025-04-14 RX ORDER — SODIUM CHLORIDE, SODIUM LACTATE, POTASSIUM CHLORIDE, CALCIUM CHLORIDE 600; 310; 30; 20 MG/100ML; MG/100ML; MG/100ML; MG/100ML
INJECTION, SOLUTION INTRAVENOUS CONTINUOUS PRN
Status: DISCONTINUED | OUTPATIENT
Start: 2025-04-14 | End: 2025-04-14

## 2025-04-14 RX ORDER — ACETAMINOPHEN 500 MG
1000 TABLET ORAL EVERY 6 HOURS PRN
Qty: 60 TABLET | Refills: 0 | Status: SHIPPED | OUTPATIENT
Start: 2025-04-14 | End: 2025-04-24

## 2025-04-14 RX ORDER — METOPROLOL TARTRATE 1 MG/ML
5 INJECTION, SOLUTION INTRAVENOUS ONCE
Status: DISCONTINUED | OUTPATIENT
Start: 2025-04-14 | End: 2025-04-14 | Stop reason: HOSPADM

## 2025-04-14 RX ADMIN — FENTANYL CITRATE 25 MCG: 50 INJECTION, SOLUTION INTRAMUSCULAR; INTRAVENOUS at 10:21

## 2025-04-14 RX ADMIN — SODIUM CHLORIDE, POTASSIUM CHLORIDE, SODIUM LACTATE AND CALCIUM CHLORIDE: 600; 310; 30; 20 INJECTION, SOLUTION INTRAVENOUS at 07:30

## 2025-04-14 RX ADMIN — Medication 100 MCG: at 07:54

## 2025-04-14 RX ADMIN — DEXMEDETOMIDINE 8 MCG: 200 INJECTION, SOLUTION INTRAVENOUS at 10:06

## 2025-04-14 RX ADMIN — ONDANSETRON 4 MG: 2 INJECTION, SOLUTION INTRAMUSCULAR; INTRAVENOUS at 10:08

## 2025-04-14 RX ADMIN — FENTANYL CITRATE 25 MCG: 50 INJECTION, SOLUTION INTRAMUSCULAR; INTRAVENOUS at 09:55

## 2025-04-14 RX ADMIN — DEXMEDETOMIDINE 8 MCG: 200 INJECTION, SOLUTION INTRAVENOUS at 10:10

## 2025-04-14 RX ADMIN — FENTANYL CITRATE 25 MCG: 50 INJECTION, SOLUTION INTRAMUSCULAR; INTRAVENOUS at 09:12

## 2025-04-14 RX ADMIN — FENTANYL CITRATE 25 MCG: 50 INJECTION, SOLUTION INTRAMUSCULAR; INTRAVENOUS at 08:32

## 2025-04-14 RX ADMIN — KETOROLAC TROMETHAMINE 30 MG: 30 INJECTION, SOLUTION INTRAMUSCULAR; INTRAVENOUS at 10:26

## 2025-04-14 RX ADMIN — Medication 50 MCG: at 08:04

## 2025-04-14 RX ADMIN — FENTANYL CITRATE 25 MCG: 50 INJECTION, SOLUTION INTRAMUSCULAR; INTRAVENOUS at 10:38

## 2025-04-14 RX ADMIN — DEXMEDETOMIDINE 8 MCG: 200 INJECTION, SOLUTION INTRAVENOUS at 09:37

## 2025-04-14 RX ADMIN — CEFAZOLIN SODIUM 2 G: 2 INJECTION, SOLUTION INTRAVENOUS at 07:42

## 2025-04-14 RX ADMIN — LIDOCAINE HYDROCHLORIDE 100 MG: 20 INJECTION, SOLUTION EPIDURAL; INFILTRATION; INTRACAUDAL; PERINEURAL at 07:35

## 2025-04-14 RX ADMIN — Medication 50 MCG: at 08:31

## 2025-04-14 RX ADMIN — DEXMEDETOMIDINE 8 MCG: 200 INJECTION, SOLUTION INTRAVENOUS at 09:41

## 2025-04-14 RX ADMIN — PROPOFOL 200 MG: 10 INJECTION, EMULSION INTRAVENOUS at 07:36

## 2025-04-14 RX ADMIN — FENTANYL CITRATE 100 MCG: 50 INJECTION, SOLUTION INTRAMUSCULAR; INTRAVENOUS at 07:35

## 2025-04-14 RX ADMIN — FENTANYL CITRATE 25 MCG: 50 INJECTION, SOLUTION INTRAMUSCULAR; INTRAVENOUS at 09:47

## 2025-04-14 RX ADMIN — ACETAMINOPHEN 975 MG: 325 TABLET, FILM COATED ORAL at 12:36

## 2025-04-14 RX ADMIN — SODIUM CHLORIDE, POTASSIUM CHLORIDE, SODIUM LACTATE AND CALCIUM CHLORIDE: 600; 310; 30; 20 INJECTION, SOLUTION INTRAVENOUS at 10:50

## 2025-04-14 RX ADMIN — MIDAZOLAM 2 MG: 1 INJECTION INTRAMUSCULAR; INTRAVENOUS at 07:30

## 2025-04-14 RX ADMIN — POVIDONE-IODINE 1 APPLICATION: 5 SOLUTION TOPICAL at 06:50

## 2025-04-14 RX ADMIN — FENTANYL CITRATE 25 MCG: 50 INJECTION, SOLUTION INTRAMUSCULAR; INTRAVENOUS at 08:25

## 2025-04-14 RX ADMIN — FENTANYL CITRATE 25 MCG: 50 INJECTION, SOLUTION INTRAMUSCULAR; INTRAVENOUS at 09:29

## 2025-04-14 RX ADMIN — DEXAMETHASONE SODIUM PHOSPHATE 8 MG: 4 INJECTION, SOLUTION INTRAMUSCULAR; INTRAVENOUS at 07:50

## 2025-04-14 SDOH — HEALTH STABILITY: MENTAL HEALTH: CURRENT SMOKER: 0

## 2025-04-14 ASSESSMENT — PAIN - FUNCTIONAL ASSESSMENT
PAIN_FUNCTIONAL_ASSESSMENT: 0-10

## 2025-04-14 ASSESSMENT — PAIN SCALES - GENERAL
PAINLEVEL_OUTOF10: 0 - NO PAIN
PAIN_LEVEL: 0
PAINLEVEL_OUTOF10: 0 - NO PAIN

## 2025-04-14 ASSESSMENT — COLUMBIA-SUICIDE SEVERITY RATING SCALE - C-SSRS
2. HAVE YOU ACTUALLY HAD ANY THOUGHTS OF KILLING YOURSELF?: NO
6. HAVE YOU EVER DONE ANYTHING, STARTED TO DO ANYTHING, OR PREPARED TO DO ANYTHING TO END YOUR LIFE?: NO
1. IN THE PAST MONTH, HAVE YOU WISHED YOU WERE DEAD OR WISHED YOU COULD GO TO SLEEP AND NOT WAKE UP?: NO

## 2025-04-14 NOTE — PROGRESS NOTES
Physical Therapy    Patient Name: John Chandler  MRN: [id number]  Department: [facility] PACU  Room: [facility] OR  Today's Date: 4/14/2025       Subjective   Info and History:  Surgical Information and Patient History  Date of Surgery: 04/14/25  Surgical Procedure: RIGHT KNEE ARTHROSCOPIC ANTERIOR CRUCIATE LIGAMENT REPAIR,     RIGHT KNEE ARTHROSCOPIC PARTIAL MEDIAL MENISCUS REPAIR  Prior to Session Communication: Bedside nurse (mother and father present)  Patient Position Received: Bed, 2 rail up, Alarm off, not on at start of session  End of Session Communication: Bedside nurse  End of Session Patient Position: Bed, 2 rail up (parents present)    Precautions:  Precautions  LE Weight Bearing Status:  (RLE WBAT)   Home Living:  Home Living  Type of Home: House  Lives With: Parent(s)  Home Adaptive Equipment: Crutches  Home Layout: Two level  Home Access: Stairs to enter with rails  Entrance Stairs-Number of Steps: 5  Bathroom Shower/Tub: Tub/shower unit (with GBs)  Bathroom Toilet: Standard  PLOF: IND ADLs/IADLS    Objective   Cognition: WFL     Pain:  Pain Assessment  Pain Assessment: 0-10  0-10 (Numeric) Pain Score: 0 - No pain    Assessment/Plan   Assessment and Plan  Recommendations: Ambulation/Gait Training:  pt ed/demo on amb with crutches and WBAT on RLE. pt demostrated proper sequencing technique and understanding of amb w/ crutches.    Transfers:  pt ed/demo STS with crutches and WBAT on RLE. pt demostrated proper sequencing technique and understanding of STS w/ crutches.       Stairs:   pt ed/demo on proper sequencing technique for ascending/decending stairs with cructhes while maintaining WBAT on RLE. pt demoed proper techique w/ stairs.          Education Documentation  Precautions, taught by Lilli Loya PT at 4/14/2025 12:58 PM.  Learner: Family, Patient  Readiness: Acceptance  Method: Explanation  Response: Verbalizes Understanding  Comment: safety techniques and RLE WBAT precautions    Mobility  Training, taught by Lilli Loya, PT at 4/14/2025 12:58 PM.  Learner: Family, Patient  Readiness: Acceptance  Method: Explanation  Response: Verbalizes Understanding  Comment: safety techniques and RLE WBAT precautions

## 2025-04-14 NOTE — ANESTHESIA PROCEDURE NOTES
Airway  Date/Time: 4/14/2025 7:37 AM  Urgency: elective    Airway not difficult    Staffing  Performed: SRNA and CRNA   Authorized by: Ramy Rose MD    Performed by: Elda Aceves  Patient location during procedure: OR    Indications and Patient Condition  Indications for airway management: anesthesia  Spontaneous Ventilation: absent  Sedation level: deep  Preoxygenated: yes  Patient position: sniffing  MILS maintained throughout  Mask difficulty assessment: 1 - vent by mask  Planned trial extubation    Final Airway Details  Final airway type: supraglottic airway      Successful airway: Supraglottic airway: igel.  Size 5     Number of attempts at approach: 1  Number of other approaches attempted: 0

## 2025-04-14 NOTE — ANESTHESIA POSTPROCEDURE EVALUATION
Patient: John Chandler    Procedure Summary       Date: 04/14/25 Room / Location: [facility] / [facility]    Anesthesia Start: 0730 Anesthesia Stop: 1051    Procedures:       RIGHT KNEE ARTHROSCOPIC ANTERIOR CRUCIATE LIGAMENT REPAIR WITH C-ARM (Right: Knee)      RIGHT KNEE ARTHROSCOPIC PARTIAL MEDIAL MENISCUS REPAIR (Right: Knee) Diagnosis:       Anterior cruciate ligament complete tear, right, initial encounter      Complex tear of medial meniscus of right knee as current injury, initial encounter      Complex tear of lateral meniscus of right knee as current injury, initial encounter      (Anterior cruciate ligament complete tear, right, initial encounter [S83.511A])      (Complex tear of medial meniscus of right knee as current injury, initial encounter [S83.231A])      (Complex tear of lateral meniscus of right knee as current injury, initial encounter [S83.271A])    Surgeons: Mandeep Campo MD Responsible Provider: Ramy Rose MD    Anesthesia Type: general ASA Status: 2            Anesthesia Type: general    Vitals Value Taken Time   /64 04/14/25 1115   Temp 36.3 °C (97.3 °F) 04/14/25 1048   Pulse 77 04/14/25 1128   Resp 16 04/14/25 1115   SpO2 97 % 04/14/25 1128   Vitals shown include unfiled device data.    Anesthesia Post Evaluation    Patient location during evaluation: PACU  Patient participation: complete - patient participated  Level of consciousness: awake and alert  Pain score: 0  Pain management: adequate  Multimodal analgesia pain management approach  Airway patency: patent  Cardiovascular status: acceptable  Respiratory status: acceptable  Hydration status: acceptable  Postoperative Nausea and Vomiting: none        No notable events documented.

## 2025-04-14 NOTE — OP NOTE
RIGHT KNEE ARTHROSCOPIC ANTERIOR CRUCIATE LIGAMENT REPAIR WITH C-ARM (R), RIGHT KNEE ARTHROSCOPIC partial MEDIAL meniscectomy (R) Operative Note     Date: 2025  OR Location: [facility]    Name: John Chandler, : 2000, Age: 24 y.o., MRN: [id number], Sex: male    Diagnosis  Pre-op Diagnosis      * Anterior cruciate ligament complete tear, right, initial encounter [S83.511A]     * Complex tear of medial meniscus of right knee as current injury, initial encounter [S83.231A]     * Complex tear of lateral meniscus of right knee as current injury, initial encounter [S83.271A] Post-op Diagnosis     * Anterior cruciate ligament complete tear, right, initial encounter [S83.511A]     * Complex tear of medial meniscus of right knee as current injury, initial encounter [S83.231A]     * Complex tear of lateral meniscus of right knee as current injury, initial encounter [S83.271A]     Procedures    Right knee arthroscopy with anterior cruciate ligament reconstruction with hamstring tendon autograft and partial medial meniscectomy    Surgeons      * Mandeep Campo - Primary    Resident/Fellow/Other Assistant:    Shannon Buchanan    Staff:   Circulator: Shirley Merrill Person: Minal  Surgical Assistant: Shannon  Relief Circulator: Lucas    Anesthesia Staff: Anesthesiologist: Ramy Rose MD  CRNA: MALA Cruz-KATARINA  SRNA: Elda Aceves    Procedure Summary  Anesthesia: General  ASA: II  Estimated Blood Loss: 10 mL    Indications: 24-year-old injured his right knee sustaining anterior cruciate ligament tear.  MRI also demonstrates tearing involving the medial and lateral meniscus.  Treatment options including no treatment were reviewed and the decision was made to proceed with surgery.    Description of procedure: Patient was brought in the operating room and general anesthesia was performed.  Examination under anesthesia was done.  There is positive Lachman.  Negative posterior drawer.  The knee was stable to  varus and valgus stress.  Range of motion is 0 to 135 degrees.  Under sterile conditions the knee was injected with Marcaine and Astramorph.  He was positioned and prepped and draped in usual fashion.  After Esmarch exsanguination tourniquet was inflated.  Incision was made over the pes anserine insertion.  Dissection was continued through the subcutaneous tissue.  Care taken to protect crossing nerve branches.  Sartorius fascia was identified and incised.  The gracilis and semitendinosus tendons were identified and harvested with a tendon stripper.  These were taken to the back table and prepared to create a graft that was 8.5 mm diameter on the femoral side and 9 mm on the tibial side.  The Arthrex ACL tight rope was used for the graft and FiberWire was used to whipstitch the ends of the tendon.  Anterolateral and anteromedial arthroscopy portals were used.  Arthroscopic examination of the joint was performed.  The articular cartilage on the patella and trochlea was intact.  No loose bodies in the medial and lateral gutters.  In the notch the ACL and PCL were intact.  In the medial compartment there was complex tearing involving the body and posterior horn of the medial meniscus.  There was a displaced meniscal flap and radial tear through the mid the posterior horn creating a tear pattern that was not repairable.  Using a combination of the motorized shaver and arthroscopic punches partial medial meniscectomy was performed until a smooth and stable edge was obtained.  Remaining meniscus was probed and was stable.  In the lateral compartment there was tearing involving the superior surface of the lateral meniscus that did not extend inferiorly this area was probed and visualized and was stable and was felt to not require additional treatment.  The articular cartilage along the femoral condyle and tibial plateau were intact.  The articular cartilage in the medial compartment was also intact.  Attention was again  turned to the notch.  ACL stump was removed.  Notchplasty was performed.  The tibial tunnel guide was used and a guidepin was drilled into the tibial insertion of the ACL.  This was overreamed with a 9 mm reamer.  A second incision was made along the lateral side of the distal femur.  The flip cutter guide was used and the flip cutter was drilled into the femoral insertion the ACL and then used to create a 8.5 mm diameter tunnel.  The knee was well irrigated.  The Arthrex tight rope was applied to the graft and this was advanced into the knee.  The button was deployed on the lateral cortex and this was confirmed with direct visualization and with fluoroscopy.  The graft was advanced into the femoral tunnel and very secure fixation was obtained.  The graft did not impinge throughout a full range of motion.  Tibial fixation was done with the knee held near full extension and a 10 x 30 mm bio composite interference screw was placed this had excellent fixation.  The graft that extended past the tunnel was then secured with a screw and spiked washer.  Very secure fixation was obtained on the tibial side.  The graft did not impinge throughout a full range of motion of the Lachman was eliminated and there was excellent tension on the graft.  The knee was well irrigated.  Tourniquet was released and hemostasis obtained.  Wounds were closed in layers and additional local anesthetic was injected.  Sterile dressing and a hinged knee brace were applied and he was taken recovery in stable condition.             Anesthesia Record               Intraprocedure I/O Totals          Intake    LR infusion 1000.00 mL    ceFAZolin (Ancef) 2 g in dextrose (iso)  mL 100.00 mL    Total Intake 1100 mL          Specimen: No specimens collected       Tourniquet Times:     Total Tourniquet Time Documented:  Thigh (Right) - 110 minutes  Total: Thigh (Right) - 110 minutes      Implants:  Implants       Type Name Action Serial No.       Implant IMPLANT, TIGHTROPE II RT, RECON IB, W/FIBERTAPE - [id number] Implanted      Screw SCREW, BIOCOMPOSITE, FAST THREAD, 10 X 30MM - [id number] Implanted      Screw SCREW, CANC, LOW PROFILE, 6.5 X 30MM - [id number] Implanted               Task Performed by RNFA or Surgical Assistant:  The assistant was required due to the complexity of the procedure.  There was no qualified resident available.  The assistant was required for preparation of the graft and assistance with drilling of the tunnels and placing the graft and with graft fixation and positioning of the leg and arthroscope throughout the procedure and wound closure.      Attending Attestation: I performed the procedure.    Mandeep Campo  Phone Number: [phone]

## 2025-04-14 NOTE — ANESTHESIA PREPROCEDURE EVALUATION
Patient: John Chandler    Procedure Information       Date/Time: 04/14/25 0730    Procedures:       RIGHT KNEE ARTHROSCOPIC ANTERIOR CRUCIATE LIGAMENT REPAIR WITH C-ARM (Right: Knee)      RIGHT KNEE ARTHROSCOPIC MEDIAL & LATERAL MENISCUS REPAIR (Right: Knee)    Location: [facility] / [facility]    Surgeons: Mandeep Campo MD            Relevant Problems   Anesthesia   (-) PONV (postoperative nausea and vomiting)      Neuro   (+) Anxiety   (+) Epilepsy       Clinical information reviewed:    Allergies  Meds               NPO Detail:  NPO/Void Status  Carbohydrate Drink Given Prior to Surgery? : N  Date of Last Liquid: 04/14/25  Time of Last Liquid: 0530  Date of Last Solid: 04/13/25  Time of Last Solid: 2200  Last Intake Type: Solid meal  Time of Last Void: 0623         Physical Exam    Airway  Mallampati: I  TM distance: >3 FB  Neck ROM: full     Cardiovascular - normal exam  Rhythm: regular  Rate: normal     Dental    Pulmonary - normal exam     Abdominal            Anesthesia Plan    History of general anesthesia?: no  History of complications of general anesthesia?: no    ASA 2     general     The patient is not a current smoker.  Education provided regarding risk of obstructive sleep apnea.  intravenous induction   Postoperative administration of opioids is intended.  Trial extubation is planned.  Anesthetic plan and risks discussed with patient.    Plan discussed with CAA, attending and CRNA.

## 2025-04-15 NOTE — PROGRESS NOTES
PHYSICAL THERAPY TREATMENT NOTE    Patient Name:  John Chandler   Patient MRN: [id number]  Date: 04/16/25  Time Calculation  Start Time: 0804  Stop Time: 0846  Time Calculation (min): 42 min      Problem List Items Addressed This Visit           ICD-10-CM    Current tear of semilunar cartilage S83.209A    Complex tear of lateral meniscus of right knee as current injury S83.271A     Other Visit Diagnoses         Codes      Rupture of anterior cruciate ligament of right knee, subsequent encounter     S83.511D            Insurance:  Visit number: 2 of 9  Insurance Type: Payor: ANTHEM / Plan: ANTHEM HMP / Product Type: *No Product type* / 20V PCY 0 USED NEEDS AUTH [employer] PAYS AT 80% AFTER DED 3500.00 2935.59 APPLIED OOP 6750.00 3221.26 APPLIED   Authorization required: yes  Authorized visits: 9  Authorized date range: 3/26-5/24/25*NO E-STIM*NO VASO**   General:  Reason for visit: S/P R ACL repair with HS tendon autograft with medial meniscus menisectomy   Referred by: Mandeep Campo MD  Next MD appt:  4/22/25     Precautions:   Weightbearing Status:  50% FFWB during 1st week (avoid TTWB) - Caution WB until nerve block wears off  Crutches:  Used immediately post-op; weaned based on quad control and heel to toe pattern   (Goal to D/C by 4 weeks)  Brace:  Locked in extension during 1st week then unlocked (as ROM dictates) with good quad   control (Goal to D/C 4-6 weeks)  ROM:   As tolerated focusing on knee extension    PMH: Epilepsy, anxiety, ADHD  Fall Risk: Low    Assessment:  Patient demonstrating/reporting good progression towards established goals and improved functional capacity  Education: Reviewed home exercise program. Home exercise program instructed and issued. Answered questions about home exercise program. Provided manual cues for correct performance of exercises. Provided verbal feedback  "to improve exercise technique. Discussed significance of applying ice to reduce pain. Educated on current precautions like keeping brace locked in extension for ambulation, ROM as tolerated, WBAT, and avoiding getting leg wet for 1 week. Provided written handout for home NMES unit.   Progress towards functional goals:  Reduced pain levels, good starting knee ROM and quad recruitment  Response to interventions: Patient tolerated therapeutic interventions well and without any adverse events. Patient required increased cuing and demonstrations for exercises which increases time required for interventions.  Justification for continued skilled care: To address remaining functional, objective and subjective deficits to allow the patient to return to full independence with ADLs. Skilled intervention required to improve ROM which will improve function. Progressive strengthening to stabilize the knee to improve function. Assess effectiveness of HEP. Modify and progress home exercise program.    Plan:  REMIND PATIENT TO SCHEDULE VISITS NV. Nustep for ROM progression. Utilize NMES, BFR, and biofeedback for quad recruitment and strengthening per patient tolerance. Progress strength and ROM per patient tolerance.    Subjective:   John reports he feels like his pain levels are improving since the surgery, but he is pretty sore.  Patient and his father deny any post-operative complications like falling or signs of infection.   Pain (0-10): 7     Objective:  R knee AROM  -6-45    LEFS: 8    Treatment Performed: (\"NP\" = Not Performed)   *NO E-STIM*NO VASO**  Access Code: [email or web address]    Therapeutic Exercise:     42 minutes  Dressing removal - 15 minutes  Supine heel slides with strap x20  Quad set with heel prop x10 5 sec holds  Quad set with NMES with heel prop 10 minutes (intensity - 26)  SLR x10    Manual Therapy:       minutes      Neuromuscular Re-education:      minutes      Gait Training:            minutes      Aquatics:      "       minutes      Therapeutic Activity:      minutes      Gait Training:            minutes      Modalities:       Vasopneumatic Device       minutes  Electrical Stimulation          minutes  Ultrasound            minutes  Iontophoresis                     minutes  Cold Pack            minutes  Mechanical Traction           minutes    Self Care Home Management:    minutes    Canalith Reposition:          minutes     Education:          minutes    Other:       minutes      Evaluation Complexity: Low:   minutes; Moderate   minutes; Complex   minutes    Re-Evaluation:   minutes

## 2025-04-16 ENCOUNTER — TREATMENT (OUTPATIENT)
Dept: PHYSICAL THERAPY | Facility: CLINIC | Age: 25
End: 2025-04-16
Payer: COMMERCIAL

## 2025-04-16 DIAGNOSIS — S83.271D COMPLEX TEAR OF LATERAL MENISCUS OF RIGHT KNEE AS CURRENT INJURY, SUBSEQUENT ENCOUNTER: ICD-10-CM

## 2025-04-16 DIAGNOSIS — S83.511D RUPTURE OF ANTERIOR CRUCIATE LIGAMENT OF RIGHT KNEE, SUBSEQUENT ENCOUNTER: ICD-10-CM

## 2025-04-16 DIAGNOSIS — S83.231D COMPLEX TEAR OF MEDIAL MENISCUS OF RIGHT KNEE AS CURRENT INJURY, SUBSEQUENT ENCOUNTER: ICD-10-CM

## 2025-04-16 PROCEDURE — 97110 THERAPEUTIC EXERCISES: CPT | Mod: GP

## 2025-04-18 ENCOUNTER — TREATMENT (OUTPATIENT)
Dept: PHYSICAL THERAPY | Facility: CLINIC | Age: 25
End: 2025-04-18
Payer: COMMERCIAL

## 2025-04-18 DIAGNOSIS — S83.231D COMPLEX TEAR OF MEDIAL MENISCUS OF RIGHT KNEE AS CURRENT INJURY, SUBSEQUENT ENCOUNTER: ICD-10-CM

## 2025-04-18 DIAGNOSIS — S83.271D COMPLEX TEAR OF LATERAL MENISCUS OF RIGHT KNEE AS CURRENT INJURY, SUBSEQUENT ENCOUNTER: ICD-10-CM

## 2025-04-18 DIAGNOSIS — S83.511D RUPTURE OF ANTERIOR CRUCIATE LIGAMENT OF RIGHT KNEE, SUBSEQUENT ENCOUNTER: ICD-10-CM

## 2025-04-18 PROBLEM — S83.511A RUPTURE OF ANTERIOR CRUCIATE LIGAMENT OF RIGHT KNEE: Status: ACTIVE | Noted: 2025-04-18

## 2025-04-18 PROCEDURE — 97110 THERAPEUTIC EXERCISES: CPT | Mod: GP

## 2025-04-18 NOTE — PROGRESS NOTES
"                                                                                                                         PHYSICAL THERAPY TREATMENT NOTE    Patient Name:  John Chandler   Patient MRN: [id number]  Date: 04/18/25  Time Calculation  Start Time: 0836  Stop Time: 0917  Time Calculation (min): 41 min      Problem List Items Addressed This Visit           ICD-10-CM    Current tear of semilunar cartilage S83.209A    Complex tear of lateral meniscus of right knee as current injury S83.271A    Rupture of anterior cruciate ligament of right knee S83.511A         Insurance:  Visit number: 3 of 9  Insurance Type: Payor: ANTHEM / Plan: ANTHEM HMP / Product Type: *No Product type* / 20V PCY 0 USED NEEDS AUTH [employer] PAYS AT 80% AFTER DED 3500.00 2935.59 APPLIED OOP 6750.00 3221.26 APPLIED   Authorization required: yes  Authorized visits: 9  Authorized date range: 3/26-5/24/25*NO E-STIM*NO VASO**   General:  Reason for visit: S/P R ACL repair with HS tendon autograft with medial meniscus menisectomy (4/14/25)  Referred by: Mandeep Campo MD  Next MD appt:  4/22/25     Precautions:   S/P R ACL repair with HS tendon autograft with medial meniscus menisectomy (4/14/25) - 4 days  Weightbearing Status:  50% FFWB during 1st week (avoid TTWB) - Caution WB until nerve block wears off  Crutches:  Used immediately post-op; weaned based on quad control and heel to toe pattern   (Goal to D/C by 4 weeks)  Brace:  Locked in extension during 1st week then unlocked (as ROM dictates) with good quad   control (Goal to D/C 4-6 weeks)  ROM:   As tolerated focusing on knee extension    PMH: Epilepsy, anxiety, ADHD  Fall Risk: Low    Subjective:   John reports he feels like his condition is improving. Patient reports  his knee is feeling \"a bit better\". Denies any significant or concerning events since last visit. Has been having difficulty keeping the brace up around the knee.    Pain (0-10): 7    HEP adherence / understanding: " "patient reports partial compliance with the instructed home exercises.    Assessment:   Patient demonstrating and reporting good progression towards established goals and improved functional capacity at this time.   Education: Reviewed home exercise program. Answered questions about home exercise program. Provided manual cues for correct performance of exercises. Provided verbal feedback to improve exercise technique.  Progress towards functional goals:  Starting off with great R knee ROM. Good recruitment of quad musculature.   Response to interventions: Patient tolerated therapeutic interventions well and without any adverse events. Improved joint mobility.  Patient was appropriately fatigued with no complaints.  Justification for continued skilled care: To address remaining functional, objective and subjective deficits to allow the patient to return to full independence with ADLs. Skilled intervention required to improve ROM which will improve function. Progressive strengthening to stabilize the knee to improve function. Modify and progress home exercise program.    Plan:  Continue with NMES, biofeedback, and BFR as able to improved quad strength and recruitment. Progress R knee ROM per patient tolerance.    Objective:     R knee AROM  -7-80 degrees    Treatment Performed: (\"NP\" = Not Performed)   *NO E-STIM*NO VASO**  Access Code: [email or web address]    Therapeutic Exercise:     41 minutes  Nustep seat 11 L3 6 minutes  Supine heel slides with strap x20  SAQ on 2 yoga blocks with m-trigger - 10 minutes - goal: [phone]  Quad set with heel prop x10 5 sec holds  Quad set with NMES with heel prop 10 minutes (intensity - 31)  Long sit calf stretch with strap with heel prop for knee extension x6 10 sec holds    Manual Therapy:       minutes      Neuromuscular Re-education:      minutes      Gait Training:            minutes      Aquatics:            minutes      Therapeutic Activity:      minutes      Gait Training:            " minutes      Modalities:       Vasopneumatic Device       minutes  Electrical Stimulation          minutes  Ultrasound            minutes  Iontophoresis                     minutes  Cold Pack            minutes  Mechanical Traction           minutes    Self Care Home Management:    minutes    Canalith Reposition:          minutes     Education:          minutes    Other:       minutes      Evaluation Complexity: Low:   minutes; Moderate   minutes; Complex   minutes    Re-Evaluation:   minutes

## 2025-04-22 ENCOUNTER — TREATMENT (OUTPATIENT)
Dept: PHYSICAL THERAPY | Facility: CLINIC | Age: 25
End: 2025-04-22
Payer: COMMERCIAL

## 2025-04-22 ENCOUNTER — OFFICE VISIT (OUTPATIENT)
Dept: ORTHOPEDIC SURGERY | Facility: CLINIC | Age: 25
End: 2025-04-22
Payer: COMMERCIAL

## 2025-04-22 ENCOUNTER — HOSPITAL ENCOUNTER (OUTPATIENT)
Dept: RADIOLOGY | Facility: CLINIC | Age: 25
Discharge: HOME | End: 2025-04-22
Payer: COMMERCIAL

## 2025-04-22 DIAGNOSIS — S83.231D COMPLEX TEAR OF MEDIAL MENISCUS OF RIGHT KNEE AS CURRENT INJURY, SUBSEQUENT ENCOUNTER: ICD-10-CM

## 2025-04-22 DIAGNOSIS — S83.271D COMPLEX TEAR OF LATERAL MENISCUS OF RIGHT KNEE AS CURRENT INJURY, SUBSEQUENT ENCOUNTER: ICD-10-CM

## 2025-04-22 DIAGNOSIS — M25.561 RIGHT KNEE PAIN, UNSPECIFIED CHRONICITY: ICD-10-CM

## 2025-04-22 DIAGNOSIS — S83.511D RUPTURE OF ANTERIOR CRUCIATE LIGAMENT OF RIGHT KNEE, SUBSEQUENT ENCOUNTER: ICD-10-CM

## 2025-04-22 DIAGNOSIS — M25.561 ACUTE PAIN OF RIGHT KNEE: Primary | ICD-10-CM

## 2025-04-22 PROCEDURE — 97110 THERAPEUTIC EXERCISES: CPT | Mod: GP

## 2025-04-22 PROCEDURE — 99211 OFF/OP EST MAY X REQ PHY/QHP: CPT | Performed by: ORTHOPAEDIC SURGERY

## 2025-04-22 PROCEDURE — 73560 X-RAY EXAM OF KNEE 1 OR 2: CPT | Mod: RIGHT SIDE | Performed by: RADIOLOGY

## 2025-04-22 PROCEDURE — 73560 X-RAY EXAM OF KNEE 1 OR 2: CPT | Mod: RT

## 2025-04-22 NOTE — PROGRESS NOTES
PHYSICAL THERAPY TREATMENT NOTE    Patient Name:  John Chandler   Patient MRN: [id number]  Date: 04/22/25  Time Calculation  Start Time: 1045  Stop Time: 1128  Time Calculation (min): 43 min      Problem List Items Addressed This Visit           ICD-10-CM    Current tear of semilunar cartilage S83.209A    Complex tear of lateral meniscus of right knee as current injury S83.271A    Rupture of anterior cruciate ligament of right knee S83.511A       Insurance:  Visit number: 4 of 9  Insurance Type: Payor: ANTHEM / Plan: ANTHEM HMP / Product Type: *No Product type* / 20V PCY 0 USED NEEDS AUTH [employer] PAYS AT 80% AFTER DED 3500.00 2935.59 APPLIED OOP 6750.00 3221.26 APPLIED   Authorization required: yes  Authorized visits: 9  Authorized date range: 3/26-5/24/25*NO E-STIM*NO VASO**   General:  Reason for visit: S/P R ACL repair with HS tendon autograft with medial meniscus menisectomy (4/14/25)  Referred by: Mandeep Campo MD  Next MD appt:  4/22/25     Precautions:   S/P R ACL repair with HS tendon autograft with medial meniscus menisectomy (4/14/25) - 8 days  Weightbearing Status:  50% FFWB during 1st week (avoid TTWB) - Caution WB until nerve block wears off  Crutches:  Used immediately post-op; weaned based on quad control and heel to toe pattern   (Goal to D/C by 4 weeks)  Brace:  Locked in extension during 1st week then unlocked (as ROM dictates) with good quad   control (Goal to D/C 4-6 weeks)  ROM:   As tolerated focusing on knee extension    PMH: Epilepsy, anxiety, ADHD  Fall Risk: Low    Subjective:   John reports he feels like his condition is improving. Patient reports  he has been a little afraid to bend his knee. Denies any significant or concerning events since last visit.    Pain (0-10): 7    HEP adherence / understanding: patient reports compliance with the instructed home  "exercises.    Assessment:   Patient demonstrating and reporting good progression towards established goals and improved functional capacity.   Education: Reviewed home exercise program. Answered questions about home exercise program. Provided verbal feedback to improve exercise technique. Encouraged continued knee flexion stretching at home and how he does not need to worry about harming himself or his recovery due to presence of pain.   Progress towards functional goals:  Good improvements in R knee ROM. Good quad recruitment.   Response to interventions: Tolerated treatment session well without any increase in pain. Improved joint mobility.  Patient was appropriately fatigued with no complaints. Improved tolerance to strengthening progressions.  Justification for continued skilled care: To address remaining functional, objective and subjective deficits to allow the patient to return to full independence with ADLs. Skilled intervention required to improve ROM which will improve function. Progressive strengthening to stabilize the knee to improve function. Modify and progress home exercise program.    Plan:  Continue with NMES, biofeedback for improved quad recruitment and strengthening. Introduce BFR and walking drills for re-learning knee flexion and TKE as able.    Objective:   R knee AROM  -4-72 degrees    87 degrees AAROM with strap    Treatment Performed: (\"NP\" = Not Performed)   *NO E-STIM*NO VASO**  Access Code: [email or web address]    Therapeutic Exercise:     43 minutes  Nustep seat 11 L3 6 minutes  Supine heel slides with strap x20  SAQ on 2 yoga blocks with m-trigger - 10 minutes - goal: [phone]  Quad set with heel prop x10 5 sec holds  SLR x10  Quad set with NMES with heel prop 10 minutes (intensity - 39)  Long sit calf stretch with strap with heel prop for knee extension x6 10 sec holds    Manual Therapy:       minutes      Neuromuscular Re-education:      minutes      Gait Training:            " minutes      Aquatics:            minutes      Therapeutic Activity:      minutes      Gait Training:            minutes      Modalities:       Vasopneumatic Device       minutes  Electrical Stimulation          minutes  Ultrasound            minutes  Iontophoresis                     minutes  Cold Pack            minutes  Mechanical Traction           minutes    Self Care Home Management:    minutes    Canalith Reposition:          minutes     Education:          minutes    Other:       minutes      Evaluation Complexity: Low:   minutes; Moderate   minutes; Complex   minutes    Re-Evaluation:   minutes

## 2025-04-24 ENCOUNTER — TREATMENT (OUTPATIENT)
Dept: PHYSICAL THERAPY | Facility: CLINIC | Age: 25
End: 2025-04-24
Payer: COMMERCIAL

## 2025-04-24 DIAGNOSIS — S83.231D COMPLEX TEAR OF MEDIAL MENISCUS OF RIGHT KNEE AS CURRENT INJURY, SUBSEQUENT ENCOUNTER: ICD-10-CM

## 2025-04-24 DIAGNOSIS — S83.511D RUPTURE OF ANTERIOR CRUCIATE LIGAMENT OF RIGHT KNEE, SUBSEQUENT ENCOUNTER: Primary | ICD-10-CM

## 2025-04-24 DIAGNOSIS — S83.271D COMPLEX TEAR OF LATERAL MENISCUS OF RIGHT KNEE AS CURRENT INJURY, SUBSEQUENT ENCOUNTER: ICD-10-CM

## 2025-04-24 PROCEDURE — 97110 THERAPEUTIC EXERCISES: CPT | Mod: GP | Performed by: PHYSICAL THERAPIST

## 2025-04-24 NOTE — PROGRESS NOTES
24-year-old is seen following right ACL reconstruction February 14, 2025.  He is progressing well.    Incisions are healed without evidence of infection.  There is resolving swelling.    Multiple x-ray views of the right knee are personally reviewed and the ACL tunnels and hardware are well-positioned.    Precautions were reviewed.  Continue with physical therapy.  Follow-up in 6 weeks.  
If you are a smoker, it is important for your health to stop smoking. Please be aware that second hand smoke is also harmful.

## 2025-04-24 NOTE — PROGRESS NOTES
PHYSICAL THERAPY TREATMENT NOTE    Patient Name:  John Chandler   Patient MRN: [id number]  Date: 04/24/25  Time Calculation  Start Time: 0415  Stop Time: 0507  Time Calculation (min): 52 min      Problem List Items Addressed This Visit           ICD-10-CM    Current tear of semilunar cartilage S83.209A    Complex tear of lateral meniscus of right knee as current injury S83.271A    Rupture of anterior cruciate ligament of right knee - Primary S83.511A       Insurance:  Visit number: 5 of 9  Insurance Type: Payor: ANTHEM / Plan: ANTHEM HMP / Product Type: *No Product type* / 20V PCY 0 USED [facility] AFTER DED 3500.00 2935.59 APPLIED OOP 6750.00 3221.26 APPLIED   Authorization required: yes  Authorized visits: 9  Authorized date range: 3/26-5/24/25*NO E-STIM*NO VASO**   General:  Reason for visit: S/P R ACL repair with HS tendon autograft with medial meniscus menisectomy (4/14/25)  Referred by: Mandeep Campo MD  Next MD appt:  4/22/25     Precautions:   S/P R ACL repair with HS tendon autograft with medial meniscus menisectomy (4/14/25) - 8 days  Weightbearing Status:  50% FFWB during 1st week (avoid TTWB) - Caution WB until nerve block wears off  Crutches:  Used immediately post-op; weaned based on quad control and heel to toe pattern   (Goal to D/C by 4 weeks)  Brace:  Locked in extension during 1st week then unlocked (as ROM dictates) with good quad   control (Goal to D/C 4-6 weeks)  ROM:   As tolerated focusing on knee extension    PMH: Epilepsy, anxiety, ADHD  Fall Risk: Low    Subjective:   John reports he feels like his condition is improving. Patient reports he is able to walk short distances without crutches.   Pain (0-10): 4    HEP adherence / understanding: HEP 2 x per day    Assessment:   Education:  advised to continue with HEP .     Progress towards functional  "goals: improved flexion.  Response to interventions: Tolerated well.  Extra cueing required to perform exercises correctly.  Justification for continued skilled care: To address remaining functional, objective and subjective deficits to allow the patient to return to full independence with ADLs.    Plan:  Unlock brace during ambulation.    Objective:   R knee AROM  4/24/25: AAROM flexion 105, ext lacking 2 degrees.    Treatment Performed: (\"NP\" = Not Performed)   *NO E-STIM*NO VASO**  Access Code: [email or web address]    Therapeutic Exercise:     52 minutes  Nustep seat 11 L3 6 minutes  Shallow Minisquat x 15 with cueing  HR x 15  TKE ball into wall: 5\" x 15    LAQ with NMES with heel prop 10 minutes (intensity - 55)  Supine heel slides with strap x 20  SAQ on 2 yoga blocks with m-trigger - 10 minutes - goal: 259-351  Quad set with heel prop x10 5 sec holds  SLR 2 x10 - emphasized full ext.    Long sit calf stretch with strap with heel prop for knee extension x6 10 sec holds      Manual Therapy:       minutes      Neuromuscular Re-education:      minutes      Gait Training:            minutes      Aquatics:            minutes      Therapeutic Activity:      minutes      Gait Training:            minutes      Modalities:       Vasopneumatic Device       minutes  Electrical Stimulation          minutes  Ultrasound            minutes  Iontophoresis                     minutes  Cold Pack            minutes  Mechanical Traction           minutes    Self Care Home Management:    minutes    Canalith Reposition:          minutes     Education:          minutes    Other:       minutes      Evaluation Complexity: Low:   minutes; Moderate   minutes; Complex   minutes    Re-Evaluation:   minutes           "

## 2025-04-29 ENCOUNTER — TREATMENT (OUTPATIENT)
Dept: PHYSICAL THERAPY | Facility: CLINIC | Age: 25
End: 2025-04-29
Payer: COMMERCIAL

## 2025-04-29 DIAGNOSIS — S83.271D COMPLEX TEAR OF LATERAL MENISCUS OF RIGHT KNEE AS CURRENT INJURY, SUBSEQUENT ENCOUNTER: ICD-10-CM

## 2025-04-29 DIAGNOSIS — S83.511D RUPTURE OF ANTERIOR CRUCIATE LIGAMENT OF RIGHT KNEE, SUBSEQUENT ENCOUNTER: ICD-10-CM

## 2025-04-29 DIAGNOSIS — S83.231D COMPLEX TEAR OF MEDIAL MENISCUS OF RIGHT KNEE AS CURRENT INJURY, SUBSEQUENT ENCOUNTER: ICD-10-CM

## 2025-04-29 PROCEDURE — 97110 THERAPEUTIC EXERCISES: CPT | Mod: GP | Performed by: PHYSICAL THERAPIST

## 2025-04-29 NOTE — PROGRESS NOTES
PHYSICAL THERAPY TREATMENT NOTE    Patient Name:  John Chandler   Patient MRN: [id number]  Date: 04/29/25  Time Calculation  Start Time: 0703  Stop Time: 0748  Time Calculation (min): 45 min      Problem List Items Addressed This Visit           ICD-10-CM       Musculoskeletal and Injuries    Rupture of anterior cruciate ligament of right knee S83.511A    Current tear of semilunar cartilage S83.209A    Complex tear of lateral meniscus of right knee as current injury S83.271A         Insurance:  Visit number: 6 of 9  Insurance Type: Payor: ANTHEM / Plan: ANTHEM HMP / Product Type: *No Product type* / 20V PCY 0 USED NEEDS AUTH [facility] PAYS AT 80% AFTER DED 3500.00 2935.59 APPLIED OOP 6750.00 3221.26 APPLIED   Authorization required: yes  Authorized visits: 9  Authorized date range: 3/26-5/24/25*NO E-STIM*NO VASO**   General:  Reason for visit: S/P R ACL repair with HS tendon autograft with medial meniscus menisectomy (4/14/25)  Referred by: Mandeep Camop MD  Next MD appt:  4/22/25     Precautions:   S/P R ACL repair with HS tendon autograft with medial meniscus menisectomy (4/14/25) - 8 days  Weightbearing Status:  50% FFWB during 1st week (avoid TTWB) - Caution WB until nerve block wears off  Crutches:  Used immediately post-op; weaned based on quad control and heel to toe pattern   (Goal to D/C by 4 weeks)  Brace:  Locked in extension during 1st week then unlocked (as ROM dictates) with good quad   control (Goal to D/C 4-6 weeks)  ROM:   As tolerated focusing on knee extension    PMH: Epilepsy, anxiety, ADHD  Fall Risk: Low    Subjective:   John reports he feels like his condition is improving. Patient reports Reduced frequency of pain. Reduced intensity of pain. Reduced swelling.   Pain (0-10): 0    HEP adherence / understanding: patient reports compliance with the instructed home  "exercises.    Assessment:   Education: Provided manual cues for correct performance of exercises. Provided verbal feedback to improve exercise technique. Educated pt. And father on using NMES at home with personal device.  Progress towards functional goals: Improved gait quality. Reduced frequency of pain. Reduced intensity of pain.  Response to interventions: Patient tolerated therapeutic interventions well and without any adverse events. Opened brace to 45 with instruction on gait.   Justification for continued skilled care: Skilled intervention required to improve ROM which will improve function. Modify and progress home exercise program.    Plan:  Exercises targeting surrounding musculature to strengthen and improve function. Exercises to gradually increase flexibility and range of motion of the knee. Training to regain normal walking patterns.  Continued skill PT needed to regain strength and motion in the knee to allow for full return to daily activities.     Objective:       Treatment Performed: (\"NP\" = Not Performed)   *NO E-STIM*NO VASO**  Access Code: [email or web address]    Therapeutic Exercise:     40 minutes  TKE GTB 5\" hold x 10   Heel raises 2 x 10   Shallow Minisquat 2 x 10 with cueing  B Iso lunge 5\" hold x 10  Shuttle DL x 15 50 lbs  Shuttle SL 2 x 15 25 lbs  Nustep seat 13 L1 5 minutes  LAQ with NMES 10 minutes (intensity - 73)    Not Performed This Session  HR x 15  Supine heel slides with strap x 20  SAQ on 2 yoga blocks with m-trigger - 10 minutes - goal: 259-351  Quad set with heel prop x10 5 sec holds  SLR 2 x10 - emphasized full ext.  Long sit calf stretch with strap with heel prop for knee extension x6 10 sec holds      Manual Therapy:       minutes      Neuromuscular Re-education:      minutes      Gait Training:            minutes      Aquatics:            minutes      Therapeutic Activity:      minutes      Gait Training:            minutes      Modalities:       Vasopneumatic Device       " minutes  Electrical Stimulation          minutes  Ultrasound            minutes  Iontophoresis                     minutes  Cold Pack            minutes  Mechanical Traction           minutes    Self Care Home Management:    minutes    Canalith Reposition:          minutes     Education:          minutes    Other:       minutes      Evaluation Complexity: Low:   minutes; Moderate   minutes; Complex   minutes    Re-Evaluation:   minutes

## 2025-05-06 ENCOUNTER — TREATMENT (OUTPATIENT)
Dept: PHYSICAL THERAPY | Facility: CLINIC | Age: 25
End: 2025-05-06
Payer: COMMERCIAL

## 2025-05-06 DIAGNOSIS — S83.231D COMPLEX TEAR OF MEDIAL MENISCUS OF RIGHT KNEE AS CURRENT INJURY, SUBSEQUENT ENCOUNTER: ICD-10-CM

## 2025-05-06 DIAGNOSIS — S83.271D COMPLEX TEAR OF LATERAL MENISCUS OF RIGHT KNEE AS CURRENT INJURY, SUBSEQUENT ENCOUNTER: ICD-10-CM

## 2025-05-06 DIAGNOSIS — S83.511D RUPTURE OF ANTERIOR CRUCIATE LIGAMENT OF RIGHT KNEE, SUBSEQUENT ENCOUNTER: ICD-10-CM

## 2025-05-06 PROCEDURE — 97110 THERAPEUTIC EXERCISES: CPT | Mod: GP

## 2025-05-06 NOTE — PROGRESS NOTES
PHYSICAL THERAPY TREATMENT NOTE    Patient Name:  John Chandler   Patient MRN: [id number]  Date: 05/06/25  Time Calculation  Start Time: 0700  Stop Time: 0750  Time Calculation (min): 50 min      Problem List Items Addressed This Visit           ICD-10-CM    Current tear of semilunar cartilage S83.209A    Complex tear of lateral meniscus of right knee as current injury S83.271A    Rupture of anterior cruciate ligament of right knee S83.511A           Insurance:  Visit number: 7 of 9  Insurance Type: Payor: ANTHEM / Plan: ANTHEM HMP / Product Type: *No Product type* / 20V PCY 0 USED NEEDS AUTH [employer] PAYS AT 80% AFTER DED 3500.00 2935.59 APPLIED OOP 6750.00 3221.26 APPLIED   Authorization required: yes  Authorized visits: 9  Authorized date range: 3/26-5/24/25*NO E-STIM*NO VASO**   General:  Reason for visit: S/P R ACL repair with HS tendon autograft with medial meniscus menisectomy (4/14/25)  Referred by: Mandeep Campo MD  Next MD appt:      Precautions:   S/P R ACL repair with HS tendon autograft with medial meniscus menisectomy (4/14/25) (3 weeks)    PMH: Epilepsy, anxiety, ADHD  Fall Risk: Low    Subjective:   John reports he feels like his condition is improving. Patient reports his knee is feeling well. Denies any significant or concerning events since last visit. States he is still sleeping with his knee brace on with a slight bend to prevent it from bending too much.     Pain (0-10): 0    HEP adherence / understanding: patient reports compliance with the instructed home exercises.    Assessment:   Patient demonstrating and reporting good progression towards established goals and improved functional capacity.   Education: Reviewed home exercise program. Home exercise program instructed and issued. Answered questions about home exercise program. Patient required moderate cueing including  "verbal cueing, tactile cueing, and visual demonstration for correct performance of today's interventions. Patient displayed fair retention of cueing.     Progress towards functional goals: Patient demonstrating good improvements in R knee AROM with come persisting stiffness in end range extension. Demonstrating improving strength with tolerance to supervised unsupported ambulation. Still some anxiousness and mild instability with this.   Response to interventions: Patient tolerated therapeutic interventions well and without any adverse events. Improved joint mobility.  Patient was appropriately fatigued with no complaints. Improved tolerance to strengthening progressions.  Justification for continued skilled care: To address remaining functional, objective and subjective deficits to allow the patient to return to full independence with ADLs. Skilled intervention required to improve ROM which will improve function. Progressive strengthening to stabilize the knee to improve function. Modify and progress home exercise program.    Plan:  Continue to progress R knee strength and mobility per patient tolerance. Quad NMES isos NV. Continue with balance and ambulation drills.    Objective:   R knee AROM  - degrees    -2-124 degrees     Treatment Performed: (\"NP\" = Not Performed)   *NO E-STIM*NO VASO**  Access Code: [email or web address]    Therapeutic Exercise:     42 minutes  Nustep seat 13 L4 5 minutes  Supine heel prop with 3# AW above knee 2 minutes  Supine calf stretch with strap x3 30 sec holds  Heel slides with strap x15  Box squat 20\" with bar support 2x10 with 3 sec hold at bottom - VC and TC for equal force distribution  Fwd/lateral step ups 6in step x10 each  Fwd/lateral step ups 8 in step x10 each 0 UE support  TKE vs Blk TB with CL hip flexion with U UE support 2x10 5 sec holds  R SLS with light UE support x2 30 sec   Low/high john step overs (R LE/L LE) x5 laps      Not Performed This Session  TKE GTB 5\" hold x 10 " "  Heel raises 2 x 10   B Iso lunge 5\" hold x 10  Shuttle DL x 15 50 lbs  Shuttle SL 2 x 15 25 lbs  LAQ with NMES 10 minutes (intensity - 73)  HR x 15  Supine heel slides with strap x 20  SAQ on 2 yoga blocks with m-trigger - 10 minutes - goal: 259-351  Quad set with heel prop x10 5 sec holds  SLR 2 x10 - emphasized full ext.  Long sit calf stretch with strap with heel prop for knee extension x6 10 sec holds      Manual Therapy:     4 minutes  Femoral-Tib AP mobs in extension GrII-III 3x10    Neuromuscular Re-education:      minutes      Gait Training:            minutes      Aquatics:            minutes      Therapeutic Activity:      minutes      Gait Training:            minutes      Modalities:       Vasopneumatic Device       minutes  Electrical Stimulation          minutes  Ultrasound            minutes  Iontophoresis                     minutes  Cold Pack            minutes  Mechanical Traction           minutes    Self Care Home Management:    minutes    Canalith Reposition:          minutes     Education:          minutes    Other:       minutes      Evaluation Complexity: Low:   minutes; Moderate   minutes; Complex   minutes    Re-Evaluation:   minutes               "

## 2025-05-13 ENCOUNTER — TREATMENT (OUTPATIENT)
Dept: PHYSICAL THERAPY | Facility: CLINIC | Age: 25
End: 2025-05-13
Payer: COMMERCIAL

## 2025-05-13 DIAGNOSIS — S83.511D RUPTURE OF ANTERIOR CRUCIATE LIGAMENT OF RIGHT KNEE, SUBSEQUENT ENCOUNTER: ICD-10-CM

## 2025-05-13 DIAGNOSIS — S83.231D COMPLEX TEAR OF MEDIAL MENISCUS OF RIGHT KNEE AS CURRENT INJURY, SUBSEQUENT ENCOUNTER: ICD-10-CM

## 2025-05-13 DIAGNOSIS — S83.271D COMPLEX TEAR OF LATERAL MENISCUS OF RIGHT KNEE AS CURRENT INJURY, SUBSEQUENT ENCOUNTER: ICD-10-CM

## 2025-05-13 PROCEDURE — 97110 THERAPEUTIC EXERCISES: CPT | Mod: GP

## 2025-05-13 NOTE — PROGRESS NOTES
PHYSICAL THERAPY TREATMENT NOTE    Patient Name:  John Chandler   Patient MRN: [id number]  Date: 05/13/25  Time Calculation  Start Time: 0704  Stop Time: 0745  Time Calculation (min): 41 min      Problem List Items Addressed This Visit           ICD-10-CM    Current tear of semilunar cartilage S83.209A    Complex tear of lateral meniscus of right knee as current injury S83.271A    Rupture of anterior cruciate ligament of right knee S83.511A       Insurance:  Visit number: 8 of 9  Insurance Type: Payor: ANTHEM / Plan: ANTHEM HMP / Product Type: *No Product type* / 20V PCY 0 USED NEEDS AUTH [employer] PAYS AT 80% AFTER DED 3500.00 2935.59 APPLIED OOP 6750.00 3221.26 APPLIED   Authorization required: yes  Authorized visits: 9  Authorized date range: 3/26-5/24/25*NO E-STIM*NO VASO**   General:  Reason for visit: S/P R ACL repair with HS tendon autograft with medial meniscus menisectomy (4/14/25)  Referred by: Mandeep Campo MD  Next MD appt:      Precautions:   S/P R ACL repair with HS tendon autograft with medial meniscus menisectomy (4/14/25) (4 weeks)    PMH: Epilepsy, anxiety, ADHD  Fall Risk: Low    Subjective:   John reports he feels like his condition is improving. Patient reports his knee is feeling well. Has not been taking pain medications to control symptoms. Denies any significant or concerning events since last visit. States he has been working on stretching his knee and has been able to use NMES at home.     Pain (0-10): 0    HEP adherence / understanding: patient reports compliance with the instructed home exercises.    Assessment:   Patient demonstrating and reporting good progression towards established goals and improved functional capacity.   Education: Recommended patient wean to unilateral crutch. Reviewed home exercise program. Home exercises instructed. Patient required max  "cueing including verbal cueing, tactile cueing, and visual demonstration for correct performance of today's interventions. Patient displayed good retention of cueing with patient demonstrating improved performance with performed repetitions only occasional repetition of cues from therapist.     Progress towards functional goals: Great improvements in R knee AROM, still some limitations in extension. Improving tolerance to weightbearing and weaning from crutches.   Response to interventions: Tolerated treatment session well without any increase in pain. Improved joint mobility.  Patient was appropriately fatigued with no complaints. Improved tolerance to strengthening progressions.  Justification for continued skilled care: To address remaining functional, objective and subjective deficits to allow the patient to return to full independence with ADLs. Skilled intervention required to improve ROM which will improve function. Progressive strengthening to stabilize the knee to improve function. Modify and progress home exercise program.    Plan:  Quad NMES isos NV. Continue with gait training and progress weightbearing strengthening with mini lunges, wall sits, leg press.    Objective:      R knee AROM  -5-142 degrees    -1-142 degrees post stretching    Treatment Performed: (\"NP\" = Not Performed)   *NO E-STIM*NO VASO**  Access Code: [email or web address]    Therapeutic Exercise:     41 minutes  Nustep seat 10 L4 5 minutes  Supine quad set with heel prop x10 5 sec holds  Long sit calf stretch with strap x3 30 sec holds  Long sit knee extension stretch with strap OP on chair prop x10 5 sec holds  Box squat 16\"  2x10 with 3 sec hold at bottom - VC and TC for equal force distribution  Single leg step ups 8in step with TKE x10  Lateral tap downs 6in step U UE support 2x10  TKE vs thick blue TB with CL hip flexion with U UE support 2x10 5 sec holds  Low/high john step overs (R LE/L LE) x5 laps  Retro sled pulls x2 laps 50#      Not " "Performed This Session  Supine heel prop with 3# AW above knee 2 minutes  Heel slides with strap x15  Fwd/lateral step ups 6in step x10 each  Fwd/lateral step ups 8 in step x10 each 0 UE support  R SLS with light UE support x2 30 sec   Knee extension isos with NMES  TKE GTB 5\" hold x 10   Heel raises 2 x 10   B Iso lunge 5\" hold x 10  Shuttle DL x 15 50 lbs  Shuttle SL 2 x 15 25 lbs  LAQ with NMES 10 minutes (intensity - 73)  HR x 15  Supine heel slides with strap x 20  SAQ on 2 yoga blocks with m-trigger - 10 minutes - goal: 259-351  Quad set with heel prop x10 5 sec holds  SLR 2 x10 - emphasized full ext.  Long sit calf stretch with strap with heel prop for knee extension x6 10 sec holds      Manual Therapy:       minutes  Femoral-Tib AP mobs in extension GrII-III 3x10    Neuromuscular Re-education:      minutes      Gait Training:            minutes      Aquatics:            minutes      Therapeutic Activity:      minutes      Gait Training:            minutes      Modalities:       Vasopneumatic Device       minutes  Electrical Stimulation          minutes  Ultrasound            minutes  Iontophoresis                     minutes  Cold Pack            minutes  Mechanical Traction           minutes    Self Care Home Management:    minutes    Canalith Reposition:          minutes     Education:          minutes    Other:       minutes      Evaluation Complexity: Low:   minutes; Moderate   minutes; Complex   minutes    Re-Evaluation:   minutes               "

## 2025-05-20 ENCOUNTER — TREATMENT (OUTPATIENT)
Dept: PHYSICAL THERAPY | Facility: CLINIC | Age: 25
End: 2025-05-20
Payer: COMMERCIAL

## 2025-05-20 DIAGNOSIS — S83.271D COMPLEX TEAR OF LATERAL MENISCUS OF RIGHT KNEE AS CURRENT INJURY, SUBSEQUENT ENCOUNTER: ICD-10-CM

## 2025-05-20 DIAGNOSIS — S83.511D RUPTURE OF ANTERIOR CRUCIATE LIGAMENT OF RIGHT KNEE, SUBSEQUENT ENCOUNTER: Primary | ICD-10-CM

## 2025-05-20 DIAGNOSIS — S83.231D COMPLEX TEAR OF MEDIAL MENISCUS OF RIGHT KNEE AS CURRENT INJURY, SUBSEQUENT ENCOUNTER: ICD-10-CM

## 2025-05-20 PROCEDURE — 97110 THERAPEUTIC EXERCISES: CPT | Mod: GP

## 2025-05-20 NOTE — PROGRESS NOTES
PHYSICAL THERAPY TREATMENT NOTE    Patient Name:  John Chandler   Patient MRN: [id number]  Date: 05/20/25  Time Calculation  Start Time: 0750  Stop Time: 0831  Time Calculation (min): 41 min      Problem List Items Addressed This Visit           ICD-10-CM    Current tear of semilunar cartilage S83.209A    Complex tear of lateral meniscus of right knee as current injury S83.271A    Rupture of anterior cruciate ligament of right knee - Primary S83.511A         Insurance:  Visit number: 9 of 9  Insurance Type: Payor: ANTHEM / Plan: ANTHEM HMP / Product Type: *No Product type* / 20V PCY 0 USED NEEDS AUTH CARELON PAYS AT 80% AFTER DED 3500.00 2935.59 APPLIED OOP 6750.00 3221.26 APPLIED   Authorization required: yes  Authorized visits: 9  Authorized date range: 3/26-5/24/25*NO E-STIM*NO VASO**   General:  Reason for visit: S/P R ACL repair with HS tendon autograft with medial meniscus menisectomy (4/14/25)  Referred by: Mandeep Campo MD  Next MD appt:      Precautions:   S/P R ACL repair with HS tendon autograft with medial meniscus menisectomy (4/14/25) (5 weeks)    PMH: Epilepsy, anxiety, ADHD  Fall Risk: Low    Subjective:   John reports he feels like his condition is improving. Patient reports he felt fine after our session last week. States he has been working on his HEP consistently. Denies any significant or concerning events since last visit.    Pain (0-10): 0    HEP adherence / understanding: patient reports compliance with the instructed home exercises.    Assessment:   Upon re-assessment of objective measures patient demonstrated improved but still significantly limited functional capacity evidenced by performance of objective measures and based on report of subjective measures. Upon review of goals and objective measures, John Chandler and I made the shared decision to continue with  PT services to further assess and address remaining objective and subjective impairments. Patient denied any remaining questions or concerns for therapist at this time and verbally agreed to updated POC.    Recommending patient continue with PT services to further assess and address remaining objective and subjective impairments     Education: Updated HEP. Provided verbal and visual cueing to patient for improved heel strike with R LE for improved gait quality. Patient demonstrated good retention of cueing throughout session as we ambulated throughout the clinic. Recommended patient discontinue crutches.  Progress towards functional goals: Patient has demonstrated good progress in early phase of ACL rehab with patient just now discontinuing from crutches. R knee AROM progressing well and strength is improving with patient tolerating more closed chain exercises.  Response to interventions: Patient tolerated therapeutic interventions well and without any adverse events. Patient was appropriately fatigued with no complaints. Improved tolerance to strengthening progressions.  Justification for continued skilled care: To address remaining functional, objective and subjective deficits to allow the patient to return to full independence with ADLs. Skilled intervention required to improve ROM which will improve function. Progressive strengthening to stabilize the knee to improve function. Modify and progress home exercise program.    Plan:  Continue with PT services 1x/wk for 11 more visits with Kranthi or any other therapist as needed. Continue to progress R knee strength and mobility per patient tolerance. Continue with gait training for normalized gait pattern.    Objective:   Gait assessment - Patient striking with midfoot and lacking full TKE, unilateral crutch    R knee AROM  -1-143 degrees    Outcome Measures  Other Measures  Lower Extremity Funtional Score (LEFS): 8 (4/16/25)  5/20/25 -  30    Goals:  Short-term goals:  "(6-12 weeks)  Full pain free ROM (5/20/25 - nearly met)  - Quad strength > or = 75% of the uninvolved LE (5/20/25 - too early to test)  - Indep normal gait. (5/20/25 - not met)  - reciprocal navigation of stairs with good eccentric strength (5/20/25 - not met)    Treatment Performed: (\"NP\" = Not Performed)   *NO E-STIM*NO VASO**  Access Code: [email or web address]    Therapeutic Exercise:     41 minutes  Long sit calf stretch with strap x3 30 sec holds with self OP above knee  Quadruped rocking for knee flexion x10 5 sec holds  Single leg wall sits x4 20 sec holds   Mini depth split squat with railing support x10 5 sec holds  Fwd tap downs 6in step 2x10  Single leg shuttle leg press 4x5 75#  Single leg knee extension isos 90 deg. With NMES intensity 51 10 minutes    Not Performed This Session  Supine quad set with heel prop x10 5 sec holds  Long sit knee extension stretch with strap OP on chair prop x10 5 sec holds  Box squat 16\"  2x10 with 3 sec hold at bottom - VC and TC for equal force distribution  Single leg step ups 8in step with TKE x10  Lateral tap downs 6in step U UE support 2x10  TKE vs thick blue TB with CL hip flexion with U UE support 2x10 5 sec holds  Low/high john step overs (R LE/L LE) x5 laps  Retro sled pulls x2 laps 50#      Manual Therapy:       minutes      Neuromuscular Re-education:      minutes      Gait Training:            minutes      Aquatics:            minutes      Therapeutic Activity:      minutes      Gait Training:            minutes      Modalities:       Vasopneumatic Device       minutes  Electrical Stimulation          minutes  Ultrasound            minutes  Iontophoresis                     minutes  Cold Pack            minutes  Mechanical Traction           minutes    Self Care Home Management:    minutes    Canalith Reposition:          minutes     Education:          minutes    Other:       minutes      Evaluation Complexity: Low:   minutes; Moderate   minutes; Complex   " minutes    Re-Evaluation:   minutes

## 2025-05-26 DIAGNOSIS — F41.9 ANXIETY DISORDER, UNSPECIFIED: ICD-10-CM

## 2025-05-27 RX ORDER — ARIPIPRAZOLE 5 MG/1
5 TABLET ORAL DAILY
Qty: 90 TABLET | Refills: 1 | Status: SHIPPED | OUTPATIENT
Start: 2025-05-27

## 2025-05-29 DIAGNOSIS — F90.2 ATTENTION DEFICIT HYPERACTIVITY DISORDER (ADHD), COMBINED TYPE: ICD-10-CM

## 2025-05-29 RX ORDER — METHYLPHENIDATE HYDROCHLORIDE 36 MG/1
72 TABLET ORAL DAILY
Qty: 60 TABLET | Refills: 0 | Status: SHIPPED | OUTPATIENT
Start: 2025-06-26 | End: 2025-07-26

## 2025-05-29 RX ORDER — METHYLPHENIDATE HYDROCHLORIDE 36 MG/1
72 TABLET ORAL DAILY
Qty: 60 TABLET | Refills: 0 | Status: SHIPPED | OUTPATIENT
Start: 2025-05-29 | End: 2025-06-28

## 2025-05-29 RX ORDER — METHYLPHENIDATE HYDROCHLORIDE 36 MG/1
72 TABLET ORAL DAILY
Qty: 60 TABLET | Refills: 0 | Status: SHIPPED | OUTPATIENT
Start: 2025-07-24 | End: 2025-08-23

## 2025-06-03 ENCOUNTER — OFFICE VISIT (OUTPATIENT)
Dept: ORTHOPEDIC SURGERY | Facility: CLINIC | Age: 25
End: 2025-06-03
Payer: COMMERCIAL

## 2025-06-03 VITALS — HEIGHT: 74 IN | BODY MASS INDEX: 26.95 KG/M2 | WEIGHT: 210 LBS

## 2025-06-03 DIAGNOSIS — M25.561 ACUTE PAIN OF RIGHT KNEE: Primary | ICD-10-CM

## 2025-06-03 PROCEDURE — 3008F BODY MASS INDEX DOCD: CPT | Performed by: ORTHOPAEDIC SURGERY

## 2025-06-03 PROCEDURE — 99024 POSTOP FOLLOW-UP VISIT: CPT | Performed by: ORTHOPAEDIC SURGERY

## 2025-06-05 NOTE — PROGRESS NOTES
PHYSICAL THERAPY TREATMENT NOTE    Patient Name:  John Chandler   Patient MRN: [id number]  Date: 06/06/25  Time Calculation  Start Time: 0704  Stop Time: 0751  Time Calculation (min): 47 min      Problem List Items Addressed This Visit           ICD-10-CM    Current tear of semilunar cartilage S83.209A    Complex tear of lateral meniscus of right knee as current injury S83.271A    Rupture of anterior cruciate ligament of right knee S83.511A         Insurance:  Visit number: 10 of 14  Insurance Type: Payor: ANTHEM / Plan: ANTHEM HMP / Product Type: *No Product type* / 20V PCY 0 USED NEEDS AUTH CARELON PAYS AT 80% AFTER DED 3500.00 2935.59 APPLIED OOP 6750.00 3221.26 APPLIED   Authorization required: yes  Authorized visits: 9 +4   Authorized date range: 3/26-5/24/25*NO E-STIM*NO VASO** EXT 5/26-6/24/25  General:  Reason for visit: S/P R ACL repair with HS tendon autograft with medial meniscus menisectomy (4/14/25)  Referred by: Mandeep Campo MD  Next MD appt:    Goals updated 5/20    Precautions:   S/P R ACL repair with HS tendon autograft with medial meniscus menisectomy (4/14/25) (7 weeks)    PMH: Epilepsy, anxiety, ADHD  Fall Risk: Low    Subjective:   John reports he feels like his condition is improving. Patient reports his knee has been doing well. States he has been exercising regularly at home, but has not been doing NMES. Denies any significant or concerning events since last visit.    Pain (0-10): 0    HEP adherence / understanding: patient reports compliance with the instructed home exercises.    Assessment:   Patient demonstrating and reporting good progression towards established goals and improved functional capacity.   Education: Reviewed home exercise program. Answered questions about home exercise program. Patient required max cueing including verbal cueing, tactile cueing, and  "visual demonstration for correct performance of today's interventions. Patient displayed good retention of cueing with patient demonstrating improved performance with performed repetitions but required repetition of cues from therapist.     Progress towards functional goals: Improved gait mechanics, improved eccentric control, improved R knee AROM, improving quad strength  Response to interventions: Patient tolerated treatment session well. Demonstrated some mild instability with static lunges, but did not lose balance or required UE support to maintain control. Improve eccentric control with tap downs, but reported moderate pain with fwd tap downs and had improved tolerance with lateral tap downs. Considerably challenged with maintaining SLS, but this was the case for the uninvolved limb as well.   Justification for continued skilled care: To address remaining functional, objective and subjective deficits to allow the patient to return to full independence with ADLs. Progressive strengthening to stabilize the knee to improve function. Modify and progress home exercise program.    Plan:  Introduce dynamic lunges NV. Progress R knee strength and mobility per patient tolerance. Continue with balance control exercises.    Objective:      Gait assessment - Patient ambulating with improved heel strike    R knee AROM  +2-146 degrees        Treatment Performed: (\"NP\" = Not Performed)   *NO E-STIM*NO VASO**  Access Code: [email or web address]    Therapeutic Exercise:     47 minutes  Dynamic warm up butt kicks and high knees  Goblet squat with 20# KB 2x10 tap to 16in box  Lunges 3x10 (1st set with arm support)  Fwd tap downs 8in step x10; lateral tap downs 8in step 2x10  Single leg knee extensions 5x6 25# each  Monster walks vs BluTB fwd and reverse 2 laps 6 meters each  Calf raises at shuttle 3x10 (50#; 62#)  SLS with ball bounce and air passes x10 each  NMES 90 deg knee extension isos intensity 81 - 10 minutes      Not Performed This " "Session  Supine quad set with heel prop x10 5 sec holds  Long sit knee extension stretch with strap OP on chair prop x10 5 sec holds  Box squat 16\"  2x10 with 3 sec hold at bottom - VC and TC for equal force distribution  Single leg step ups 8in step with TKE x10  Lateral tap downs 6in step U UE support 2x10  TKE vs thick blue TB with CL hip flexion with U UE support 2x10 5 sec holds  Low/high john step overs (R LE/L LE) x5 laps  Retro sled pulls x2 laps 50#      Manual Therapy:       minutes      Neuromuscular Re-education:      minutes      Gait Training:            minutes      Aquatics:            minutes      Therapeutic Activity:      minutes      Gait Training:            minutes      Modalities:       Vasopneumatic Device       minutes  Electrical Stimulation          minutes  Ultrasound            minutes  Iontophoresis                     minutes  Cold Pack            minutes  Mechanical Traction           minutes    Self Care Home Management:    minutes    Canalith Reposition:          minutes     Education:          minutes    Other:       minutes      Evaluation Complexity: Low:   minutes; Moderate   minutes; Complex   minutes    Re-Evaluation:   minutes               "

## 2025-06-05 NOTE — PROGRESS NOTES
24-year-old is seen following right anterior cruciate ligament reconstruction on April 14, 2025.  He is progressing well.    Incisions healed without evidence of infection.  Mild swelling.  Firm endpoint with Lachman with minimal translation.    He is progressing well following ACL reconstruction.  Continue with physical therapy.  Precautions reviewed.  Follow-up to assess his progress.

## 2025-06-06 ENCOUNTER — TREATMENT (OUTPATIENT)
Dept: PHYSICAL THERAPY | Facility: CLINIC | Age: 25
End: 2025-06-06
Payer: COMMERCIAL

## 2025-06-06 DIAGNOSIS — S83.511D RUPTURE OF ANTERIOR CRUCIATE LIGAMENT OF RIGHT KNEE, SUBSEQUENT ENCOUNTER: ICD-10-CM

## 2025-06-06 DIAGNOSIS — S83.271D COMPLEX TEAR OF LATERAL MENISCUS OF RIGHT KNEE AS CURRENT INJURY, SUBSEQUENT ENCOUNTER: ICD-10-CM

## 2025-06-06 DIAGNOSIS — S83.231D COMPLEX TEAR OF MEDIAL MENISCUS OF RIGHT KNEE AS CURRENT INJURY, SUBSEQUENT ENCOUNTER: ICD-10-CM

## 2025-06-06 PROCEDURE — 97110 THERAPEUTIC EXERCISES: CPT | Mod: GP

## 2025-06-08 DIAGNOSIS — F41.9 ANXIETY DISORDER, UNSPECIFIED: ICD-10-CM

## 2025-06-08 DIAGNOSIS — F90.9 ATTENTION-DEFICIT HYPERACTIVITY DISORDER, UNSPECIFIED TYPE: ICD-10-CM

## 2025-06-10 RX ORDER — GUANFACINE 2 MG/1
2 TABLET, EXTENDED RELEASE ORAL DAILY
Qty: 90 TABLET | Refills: 1 | Status: SHIPPED | OUTPATIENT
Start: 2025-06-10

## 2025-06-10 RX ORDER — FLUOXETINE HYDROCHLORIDE 40 MG/1
40 CAPSULE ORAL DAILY
Qty: 90 CAPSULE | Refills: 1 | Status: SHIPPED | OUTPATIENT
Start: 2025-06-10

## 2025-06-13 ENCOUNTER — APPOINTMENT (OUTPATIENT)
Dept: PHYSICAL THERAPY | Facility: CLINIC | Age: 25
End: 2025-06-13
Payer: COMMERCIAL

## 2025-06-19 NOTE — PROGRESS NOTES
PHYSICAL THERAPY TREATMENT NOTE    Patient Name:  John Chandler   Patient MRN: [id number]  Date: 06/20/25  Time Calculation  Start Time: 0700  Stop Time: 0743  Time Calculation (min): 43 min      Problem List Items Addressed This Visit           ICD-10-CM    Current tear of semilunar cartilage S83.209A    Complex tear of lateral meniscus of right knee as current injury S83.271A    Rupture of anterior cruciate ligament of right knee S83.511A           Insurance:  Visit number: 11 of 14  Insurance Type: Payor: ANTHEM / Plan: ANTHEM HMP / Product Type: *No Product type* / 20V PCY 0 USED NEEDS AUTH CARELON PAYS AT 80% AFTER DED 3500.00 2935.59 APPLIED OOP 6750.00 3221.26 APPLIED   Authorization required: yes  Authorized visits: 9 +4   Authorized date range: 3/26-5/24/25*NO E-STIM*NO VASO** EXT 5/26-6/24/25  General:  Reason for visit: S/P R ACL repair with HS tendon autograft with medial meniscus menisectomy (4/14/25)  Referred by: Mandeep Campo MD  Next MD appt:    Goals updated 5/20    Precautions:   S/P R ACL repair with HS tendon autograft with medial meniscus menisectomy (4/14/25) (9 weeks)    PMH: Epilepsy, anxiety, ADHD  Fall Risk: Low    Subjective:   John reports he feels like his condition is improving. Patient reports his knee has been doing well. States he has been exercising regularly at home, and has been doing NMES. Denies any significant or concerning events since last visit.    Pain (0-10): 0    HEP adherence / understanding: patient reports compliance with the instructed home exercises.    Assessment:   Patient demonstrating and reporting good progression towards established goals and improved functional capacity.   Education: Reviewed home exercise program. Answered questions about home exercise program. Patient required cueing including verbal cueing and visual demonstration  "for correct performance of today's interventions. Patient displayed good retention of cueing with patient demonstrating improved performance with performed repetitions but required repetition of cues from therapist.     Progress towards functional goals: Improved gait mechanics, improved eccentric control, improved R knee AROM, improving quad strength with jump to 55 pounds for same rep range he did 25lbs at 2 weeks ago.  Response to interventions: Patient tolerated treatment session well with no pain. Demonstrated some mild instability with tandem stance, but did not lose balance or require UE support to maintain control.  Considerably challenged with maintaining SLS, but this was the case for the uninvolved limb as well.   Justification for continued skilled care: To address remaining functional, objective and subjective deficits to allow the patient to return to full independence with ADLs. Progressive strengthening to stabilize the knee to improve function. Modify and progress home exercise program.    Plan:  Introduce dynamic lunges NV. Progress R knee strength and mobility per patient tolerance. Continue with balance control exercises.    Objective:      Gait assessment - Patient ambulating with improved heel strike    R knee AROM 6/20      R knee AROM  0-135 degrees        Treatment Performed: (\"NP\" = Not Performed)   *NO E-STIM*NO VASO**  Access Code: [email or web address]    Therapeutic Exercise:     38 minutes  Dynamic warm up butt kicks and high knees, walking lunges with a twist  Split squats 3x10 BL with 10 lb KB  Tandem/ split stance balance with ball throws: 3x30 sec BL  Slant board Goblet squat with 20# KB 1x10, 30# KB 2x10 followed by 30 pogos after each set  Monster walks vs Green TB fwd and reverse 2 laps 12 meters each  Single leg knee extensions 1x10 25 lb. 1x10 35 lb, 4x6 55 lb each      Not Performed This Session  Supine quad set with heel prop x10 5 sec holds  Long sit knee extension stretch with strap OP " "on chair prop x10 5 sec holds  Box squat 16\"  2x10 with 3 sec hold at bottom - VC and TC for equal force distribution  Single leg step ups 8in step with TKE x10  Lateral tap downs 6in step U UE support 2x10  TKE vs thick blue TB with CL hip flexion with U UE support 2x10 5 sec holds  Low/high john step overs (R LE/L LE) x5 laps  Retro sled pulls x2 laps 50#  Lunges 3x10 (1st set with arm support)  Fwd tap downs 8in step x10; lateral tap downs 8in step 2x10  NMES 90 deg knee extension isos intensity 81 - 10 minutes  Calf raises at shuttle 3x10 (50#; 62#)      Manual Therapy:     5 minutes  MWM Tibial ER + quad Set into knee extension  PNF stretching R hamstring SLR    Neuromuscular Re-education:      minutes      Gait Training:            minutes      Aquatics:            minutes      Therapeutic Activity:      minutes      Gait Training:            minutes      Modalities:       Vasopneumatic Device       minutes  Electrical Stimulation          minutes  Ultrasound            minutes  Iontophoresis                     minutes  Cold Pack            minutes  Mechanical Traction           minutes    Self Care Home Management:    minutes    Canalith Reposition:          minutes     Education:          minutes    Other:       minutes      Evaluation Complexity: Low:   minutes; Moderate   minutes; Complex   minutes    Re-Evaluation:   minutes               "

## 2025-06-20 ENCOUNTER — TREATMENT (OUTPATIENT)
Dept: PHYSICAL THERAPY | Facility: CLINIC | Age: 25
End: 2025-06-20
Payer: COMMERCIAL

## 2025-06-20 DIAGNOSIS — S83.511D RUPTURE OF ANTERIOR CRUCIATE LIGAMENT OF RIGHT KNEE, SUBSEQUENT ENCOUNTER: ICD-10-CM

## 2025-06-20 DIAGNOSIS — S83.271D COMPLEX TEAR OF LATERAL MENISCUS OF RIGHT KNEE AS CURRENT INJURY, SUBSEQUENT ENCOUNTER: ICD-10-CM

## 2025-06-20 DIAGNOSIS — S83.231D COMPLEX TEAR OF MEDIAL MENISCUS OF RIGHT KNEE AS CURRENT INJURY, SUBSEQUENT ENCOUNTER: ICD-10-CM

## 2025-06-20 PROCEDURE — 97110 THERAPEUTIC EXERCISES: CPT | Mod: GP

## 2025-06-24 ENCOUNTER — TREATMENT (OUTPATIENT)
Dept: PHYSICAL THERAPY | Facility: CLINIC | Age: 25
End: 2025-06-24
Payer: COMMERCIAL

## 2025-06-24 ENCOUNTER — APPOINTMENT (OUTPATIENT)
Dept: PHYSICAL THERAPY | Facility: CLINIC | Age: 25
End: 2025-06-24
Payer: COMMERCIAL

## 2025-06-24 DIAGNOSIS — S83.511D RUPTURE OF ANTERIOR CRUCIATE LIGAMENT OF RIGHT KNEE, SUBSEQUENT ENCOUNTER: ICD-10-CM

## 2025-06-24 DIAGNOSIS — S83.231D COMPLEX TEAR OF MEDIAL MENISCUS OF RIGHT KNEE AS CURRENT INJURY, SUBSEQUENT ENCOUNTER: ICD-10-CM

## 2025-06-24 DIAGNOSIS — G47.9 SLEEP DISORDER, UNSPECIFIED: ICD-10-CM

## 2025-06-24 DIAGNOSIS — S83.271D COMPLEX TEAR OF LATERAL MENISCUS OF RIGHT KNEE AS CURRENT INJURY, SUBSEQUENT ENCOUNTER: ICD-10-CM

## 2025-06-24 PROCEDURE — 97110 THERAPEUTIC EXERCISES: CPT | Mod: GP

## 2025-06-24 NOTE — PROGRESS NOTES
PHYSICAL THERAPY TREATMENT NOTE    Patient Name:  John Chandler   Patient MRN: [id number]  Date: 06/24/25  Time Calculation  Start Time: 1615  Stop Time: 1700  Time Calculation (min): 45 min      Problem List Items Addressed This Visit           ICD-10-CM    Current tear of semilunar cartilage S83.209A    Complex tear of lateral meniscus of right knee as current injury S83.271A    Rupture of anterior cruciate ligament of right knee S83.511A       Insurance:  Visit number: 12 of 14  Insurance Type: Payor: ANTHEM / Plan: ANTHEM HMP / Product Type: *No Product type* / 20V PCY 0 USED NEEDS AUTH CARELON PAYS AT 80% AFTER DED 3500.00 2935.59 APPLIED OOP 6750.00 3221.26 APPLIED   Authorization required: yes  Authorized visits: 9 +4   Authorized date range: 3/26-5/24/25*NO E-STIM*NO VASO** EXT 5/26-6/24/25  General:  Reason for visit: S/P R ACL repair with HS tendon autograft with medial meniscus menisectomy (4/14/25)  Referred by: Mandeep Campo MD  Next MD appt:    Goals updated 5/20, 6/24/25    Precautions:   S/P R ACL repair with HS tendon autograft with medial meniscus menisectomy (4/14/25) (10 weeks)    PMH: Autism, Epilepsy, anxiety, ADHD  Fall Risk: Low    Subjective:   John reports he feels like his condition is improving. Patient reports he has been exercising at home regularly. Denies any significant or concerning events since last visit.    Pain (0-10): 0    HEP adherence / understanding: patient reports compliance with the instructed home exercises.    Assessment:   Upon re-assessment of objective measures patient demonstrated improved functional capacity evidenced by performance of objective measures and based on report of subjective measures. While Luis has demonstrated good improvements in functional capacity for basic ADLs like ambulation and stair navigation, he is still unable to  safely participate in higher level, meaningful activities that benefit his physical, mental, emotional, and social determinants of health like ninja warrior exercise, recreational basketball, and baseball. Activities like these are essential for maintaining health, wellness, and a high quality of life. Luis requires skilled rehab services in order to safely progress to be able to return to participating in these meaningful activities. Upon review of goals and objective measures, John Chandler and I made the shared decision to continue with PT services to further assess and address remaining objective and subjective impairments. Patient denied any remaining questions or concerns for therapist at this time and verbally agreed to updated POC.    Education: Updated HEP. Patient required moderate cueing including verbal cueing and visual demonstration for correct performance of today's interventions. Patient displayed good retention of cueing with patient demonstrating improved performance with performed repetitions without repetition of cues from therapist.     Response to interventions:  Patient tolerated treatment session well. Demonstrated improving strength with tolerance to progression in resistance with strengthening interventions. Tolerated progression of strengthening exercises well with no c/o increase in sx. Patient reported/demonstrated appropriate fatigue. No c/o increase in sx throughout today's session.   Justification for continued skilled care: To address remaining functional, objective and subjective deficits to allow the patient to return to full independence with ADLs.    Plan:  Continue with PT services 1x/wk with Kranthi for 8 more visits. Continue with low level plyometrics and introduce TM jogging intervals. Progress quad strength per patient tolerance. Continue to incorporate balance/control exercises.    Objective:   R knee AROM  +1-143 degrees     Gait assessment - Patient ambulating with  "normal gait. Good heel strike at stance phase and improved knee flexion in swing phase.     Stair Assessment: patient demonstrated ability to navigate full flight of stairs independently with reciprocal pattern with no UE support    Quad MVIC 25 (90 degrees)  Quad R L         Trial 1 61.8 81.9         Trial 2 62.9 85.2         Trial 3 69.8 96.3         Av.8 87.8  R L ratio 0.74  L R ratio 1.35         Outcome Measures  Other Measures  Lower Extremity Funtional Score (LEFS): 8 (25)  25 -  30  25 - 52     Goals:  Short-term goals: (6-12 weeks)  Full pain free ROM (25 - nearly met) (25 - met)  - Quad strength > or = 75% of the uninvolved LE (25 - too early to test)  (25 - nearly met)  - Indep normal gait. (25 - not met) (25 - met)  - reciprocal navigation of stairs with good eccentric strength (25 - not met) (25 - met)    Long-term goals: 6-12 months  1.) Independent with HEP to maintain improvements in functional capacity and promote return to PLOF. (25 - progressing)  2.) Quad strength >/= 90% of uninvolved LE (25 - progressing)  3.) Hop test >/= 90% of uninvolved LE (25 - progressing)  4.) ACL RSI >/= 65 to demonstrate mental readiness to return to meaningful high level activities like ninja warrior exercise, baseball, basketball, and other recreational activities (25 - progressing)    Treatment Performed: (\"NP\" = Not Performed)   *NO E-STIM*NO VASO**  Access Code: [email or web address]    Therapeutic Exercise:     39 minutes  Dynamic warm up butt kicks and walking lunges  Review of goals and objective measures - 19 minutes  Single leg knee extensions 3x5 55# lb each  Fwd/lateral lunges with push back x10 each  8 in box jump 2x10  KARYN vertical jump x5  Single leg shuttle press 5x5 100#    Not Performed This Session  Split squats 3x10 BL with 10 lb KB  Tandem/ split stance balance with ball throws: 3x30 sec BL  Slant board Goblet squat with 20# " "KB 1x10, 30# KB 2x10 followed by 30 pogos after each set  Monster walks vs Green TB fwd and reverse 2 laps 12 meters each  Supine quad set with heel prop x10 5 sec holds  Long sit knee extension stretch with strap OP on chair prop x10 5 sec holds  Box squat 16\"  2x10 with 3 sec hold at bottom - VC and TC for equal force distribution  Single leg step ups 8in step with TKE x10  Lateral tap downs 6in step U UE support 2x10  TKE vs thick blue TB with CL hip flexion with U UE support 2x10 5 sec holds  Low/high john step overs (R LE/L LE) x5 laps  Retro sled pulls x2 laps 50#  Lunges 3x10 (1st set with arm support)  Fwd tap downs 8in step x10; lateral tap downs 8in step 2x10  NMES 90 deg knee extension isos intensity 81 - 10 minutes  Calf raises at shuttle 3x10 (50#; 62#)      Manual Therapy:       minutes      Neuromuscular Re-education:      minutes      Gait Training:            minutes      Aquatics:            minutes      Therapeutic Activity:      minutes      Gait Training:            minutes      Modalities:       Vasopneumatic Device       minutes  Electrical Stimulation          minutes  Ultrasound            minutes  Iontophoresis                     minutes  Cold Pack            minutes  Mechanical Traction           minutes    Self Care Home Management:    minutes    Canalith Reposition:          minutes     Education:          minutes    Other:       minutes      Evaluation Complexity: Low:   minutes; Moderate   minutes; Complex   minutes    Re-Evaluation:   minutes               "

## 2025-06-25 RX ORDER — CLONIDINE HYDROCHLORIDE 0.2 MG/1
0.2 TABLET ORAL NIGHTLY
Qty: 90 TABLET | Refills: 1 | Status: SHIPPED | OUTPATIENT
Start: 2025-06-25

## 2025-06-27 ENCOUNTER — APPOINTMENT (OUTPATIENT)
Dept: PHYSICAL THERAPY | Facility: CLINIC | Age: 25
End: 2025-06-27
Payer: COMMERCIAL

## 2025-07-01 ENCOUNTER — APPOINTMENT (OUTPATIENT)
Dept: PEDIATRIC NEUROLOGY | Facility: CLINIC | Age: 25
End: 2025-07-01
Payer: COMMERCIAL

## 2025-07-01 VITALS — BODY MASS INDEX: 25.73 KG/M2 | WEIGHT: 200.4 LBS

## 2025-07-01 DIAGNOSIS — G47.9 SLEEP DISTURBANCES: Primary | ICD-10-CM

## 2025-07-01 DIAGNOSIS — F41.9 ANXIETY: ICD-10-CM

## 2025-07-01 DIAGNOSIS — F90.8 OTHER SPECIFIED ATTENTION DEFICIT HYPERACTIVITY DISORDER (ADHD): ICD-10-CM

## 2025-07-01 PROCEDURE — 99213 OFFICE O/P EST LOW 20 MIN: CPT | Performed by: PSYCHIATRY & NEUROLOGY

## 2025-07-01 RX ORDER — CLONIDINE HYDROCHLORIDE 0.3 MG/1
0.3 TABLET ORAL NIGHTLY
Qty: 30 TABLET | Refills: 0 | Status: SHIPPED | OUTPATIENT
Start: 2025-07-01 | End: 2025-07-31

## 2025-07-01 ASSESSMENT — ENCOUNTER SYMPTOMS
OCCASIONAL FEELINGS OF UNSTEADINESS: 0
LOSS OF SENSATION IN FEET: 0

## 2025-07-01 NOTE — PROGRESS NOTES
Subjective   John Chandler is a 24 y.o.  man with ADHD.  HPI  John a 24-year-old young man with anxiety, ADHD, and intellectual challenges. He takes Concerta 72 mg every morning (missing the dose is associated with an obvious worsening of attention and silliness), guanfacine ER 2 mg qAM (higher dose worsened behaviors) and fluoxetine 40 mg every evening. He has picking behavior but no reported OCD behaviors. Abilify 5 mg qAM has reduced anger, mood swings, and threatening behaviors.  He and his mother are pleased with the medication effect.     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 He worked at [employer] on the [employer] line without a  (9 AM to 2 PM) and was fired.    John is having difficulty falling asleep with melatonin 10 mg and clonidine 0.2 mg at bedtime.     Review of Systems  All other systems have been reviewed with no other pertinent positives except epilepsy with a generalized convulsion about 2 years ago. He remains on Depakote 500 mg bid and ethosuximide 500 mg bid. By report, EEG was abnormal in the past and had a recent vEEG. He has constipation that requires treatment.  He had ACL repair for injury during [employer] training.  Constipation is an issue that he does not address.  Objective   Neurological Exam  Mental Status  Awake and alert.    Motor   No abnormal involuntary movements.    Physical Exam  Constitutional:       General: He is awake.   Neurological:      Mental Status: He is alert.   Psychiatric:         Mood and Affect: Mood normal.         Behavior: Behavior normal.       Assessment/Plan     John is doing adequately on his ADHD and anxiety medications.  He has no side effects.  He has problems falling asleep at a reasonable time, in part  related to decreased physical activity and no constant employment.    No change in medication.  Increase clonidine to 0.3 mg to help with sleep.  This may be modifiable when he gets back to his regular physical activity routine.  Consider weaning guanfacine ER when appropriate.  Call if problems  Discussed addressing his bowel movement issue  Follow up in 1 year

## 2025-07-01 NOTE — PATIENT INSTRUCTIONS
John is doing adequately on his ADHD and anxiety medications.  He has no side effects.  He has problems falling asleep at a reasonable time, in part related to decreased physical activity and no constant employment.    No change in medication.  Increase clonidine to 0.3 mg to help with sleep.  This may be modifiable when he gets back to his regular physical activity routine.  Consider weaning guanfacine ER when appropriate.  Call if problems  Discussed addressing his bowel movement issue  Follow up in 1 year

## 2025-07-01 NOTE — LETTER
July 1, 2025     Hema Putnam MD  [address]  [address]  [facility] [address]    Patient: John Chandler   YOB: 2000   Date of Visit: 7/1/2025       Dear Dr. Hema Putnam MD:    Thank you for referring John Chandler to me for evaluation. Below are my notes for this consultation.  If you have questions, please do not hesitate to call me. I look forward to following your patient along with you.       Sincerely,     Karthik Rm MD      CC: No Recipients  ______________________________________________________________________________________    Subjective  John Chandler is a 24 y.o.  man with ADHD.  HPI  John a 24-year-old young man with anxiety, ADHD, and intellectual challenges. He takes Concerta 72 mg every morning (missing the dose is associated with an obvious worsening of attention and silliness), guanfacine ER 2 mg qAM (higher dose worsened behaviors) and fluoxetine 40 mg every evening. He has picking behavior but no reported OCD behaviors. Abilify 5 mg qAM has reduced anger, mood swings, and threatening behaviors.  He and his mother are pleased with the medication effect.     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    John is having difficulty falling asleep with melatonin 10 mg and clonidine 0.2 mg at bedtime.     Review of Systems  All other systems have been reviewed with no other pertinent positives except epilepsy with a generalized convulsion about 2 years ago. He remains on Depakote 500 mg bid and ethosuximide 500 mg bid. By report, EEG was abnormal in the past and had a recent vEEG. He has constipation that requires treatment.  He had ACL repair for  injury during [employer] training.  Constipation is an issue that he does not address.  Objective  Neurological Exam  Mental Status  Awake and alert.    Motor   No abnormal involuntary movements.    Physical Exam  Constitutional:       General: He is awake.   Neurological:      Mental Status: He is alert.   Psychiatric:         Mood and Affect: Mood normal.         Behavior: Behavior normal.       Assessment/Plan    John is doing adequately on his ADHD and anxiety medications.  He has no side effects.  He has problems falling asleep at a reasonable time, in part related to decreased physical activity and no constant employment.    No change in medication.  Increase clonidine to 0.3 mg to help with sleep.  This may be modifiable when he gets back to his regular physical activity routine.  Consider weaning guanfacine ER when appropriate.  Call if problems  Discussed addressing his bowel movement issue  Follow up in 1 year

## 2025-07-08 ENCOUNTER — TREATMENT (OUTPATIENT)
Dept: PHYSICAL THERAPY | Facility: CLINIC | Age: 25
End: 2025-07-08
Payer: COMMERCIAL

## 2025-07-08 DIAGNOSIS — S83.271D COMPLEX TEAR OF LATERAL MENISCUS OF RIGHT KNEE AS CURRENT INJURY, SUBSEQUENT ENCOUNTER: ICD-10-CM

## 2025-07-08 DIAGNOSIS — S83.511D RUPTURE OF ANTERIOR CRUCIATE LIGAMENT OF RIGHT KNEE, SUBSEQUENT ENCOUNTER: ICD-10-CM

## 2025-07-08 DIAGNOSIS — S83.231D COMPLEX TEAR OF MEDIAL MENISCUS OF RIGHT KNEE AS CURRENT INJURY, SUBSEQUENT ENCOUNTER: ICD-10-CM

## 2025-07-08 PROCEDURE — 97110 THERAPEUTIC EXERCISES: CPT | Mod: GP

## 2025-07-08 NOTE — PROGRESS NOTES
PHYSICAL THERAPY TREATMENT NOTE    Patient Name:  John Chandler   Patient MRN: [id number]  Date: 07/08/25  Time Calculation  Start Time: 0658  Stop Time: 0738  Time Calculation (min): 40 min      Problem List Items Addressed This Visit           ICD-10-CM    Current tear of semilunar cartilage S83.209A    Complex tear of lateral meniscus of right knee as current injury S83.271A    Rupture of anterior cruciate ligament of right knee S83.511A       Insurance:  Visit number: 13 of 20  Insurance Type: Payor: ANTHEM / Plan: ANTHEM HMP / Product Type: *No Product type* / 20V PCY 0 USED NEEDS AUTH CARELON PAYS AT 80% AFTER DED 3500.00 2935.59 APPLIED OOP 6750.00 3221.26 APPLIED   Authorization required: yes  Authorized visits: 9 +4 +8  Authorized date range: 3/26-5/24/25*NO E-STIM*NO VASO** EXT 5/26-6/24/25 EXT 6/30-8/28/25*  General:  Reason for visit: S/P R ACL repair with HS tendon autograft with medial meniscus menisectomy (4/14/25)  Referred by: Mandeep Campo MD  Next MD appt:    Goals updated 5/20, 6/24/25    Precautions:   S/P R ACL repair with HS tendon autograft with medial meniscus menisectomy (4/14/25) (12 weeks)    PMH: Autism, Epilepsy, anxiety, ADHD  Fall Risk: Low    Subjective:   John reports he feels like his condition is improving. Patient reports he has been working on his jumping and strengthening exercises at home. Denies any significant or concerning events since last visit.   Pain (0-10): 0    HEP adherence / understanding: patient reports compliance with the instructed home exercises.    Assessment:   Patient demonstrating and reporting good progression towards established goals and improved functional capacity.   Progress towards functional goals: Tolerated intro to jogging intervals without c/o increase in pain or soreness. Full R knee AROM and improving quad strength.  "  Education: Reviewed home exercise program. Provided handout for recommended jogging intervals.  Response to interventions:  Patient tolerated treatment session well. Tolerated introduction to TM jogging well with no soreness or pain reported from patient. Good tolerance to progression of low level plyometrics and heavy LE strengthening. Reported appropriate muscular fatigue by end of session.   Justification for continued skilled care: To address remaining functional, objective and subjective deficits to allow the patient to return to full independence with ADLs.    Plan:  Progress jogging duration for jogging intervals. Continue with low level plyometrics and heavy LE strengthening. Incorporate balance and control exercises as able.    Objective:      R knee AROM  +1-143 degrees       Quad MVIC / (90 degrees)  Quad R L         Trial 1 61.8 81.9         Trial 2 62.9 85.2         Trial 3 69.8 96.3         Av.8 87.8  R L ratio 0.74  L R ratio 1.35         Treatment Performed: (\"NP\" = Not Performed)   *NO E-STIM*NO VASO**  Access Code: [email or web address]    Therapeutic Exercise:     40 minutes  Dynamic warm up butt kicks and walking lunges and side shuffles  TM W/J intervals   1 min walk (2.5 mph)/1 min jog (4.3-4.5 mph) x5  8 in box jump x10  16 in box jump x10  Step up box jumps 8in step 2x10  Lateral ski jumps from line to line 2x10  Single leg shuttle press 4x6 112#  Single leg knee extensions 3x5 55# lb each  Single leg HSC machine 55# 2x10 each    Not Performed This Session  Fwd/lateral lunges with push back x10 each  Split squats 3x10 BL with 10 lb KB  Tandem/ split stance balance with ball throws: 3x30 sec BL  Slant board Goblet squat with 20# KB 1x10, 30# KB 2x10 followed by 30 pogos after each set  Monster walks vs Green TB fwd and reverse 2 laps 12 meters each  Supine quad set with heel prop x10 5 sec holds  Long sit knee extension stretch with strap OP on chair prop x10 5 sec holds  Box squat 16\"  2x10 " with 3 sec hold at bottom - VC and TC for equal force distribution  Single leg step ups 8in step with TKE x10  Lateral tap downs 6in step U UE support 2x10  TKE vs thick blue TB with CL hip flexion with U UE support 2x10 5 sec holds  Low/high john step overs (R LE/L LE) x5 laps  Retro sled pulls x2 laps 50#  Lunges 3x10 (1st set with arm support)  Fwd tap downs 8in step x10; lateral tap downs 8in step 2x10  NMES 90 deg knee extension isos intensity 81 - 10 minutes  Calf raises at shuttle 3x10 (50#; 62#)      Manual Therapy:       minutes      Neuromuscular Re-education:      minutes      Gait Training:            minutes      Aquatics:            minutes      Therapeutic Activity:      minutes      Gait Training:            minutes      Modalities:       Vasopneumatic Device       minutes  Electrical Stimulation          minutes  Ultrasound            minutes  Iontophoresis                     minutes  Cold Pack            minutes  Mechanical Traction           minutes    Self Care Home Management:    minutes    Canalith Reposition:          minutes     Education:          minutes    Other:       minutes      Evaluation Complexity: Low:   minutes; Moderate   minutes; Complex   minutes    Re-Evaluation:   minutes

## 2025-07-15 ENCOUNTER — TREATMENT (OUTPATIENT)
Dept: PHYSICAL THERAPY | Facility: CLINIC | Age: 25
End: 2025-07-15
Payer: COMMERCIAL

## 2025-07-15 DIAGNOSIS — S83.511D RUPTURE OF ANTERIOR CRUCIATE LIGAMENT OF RIGHT KNEE, SUBSEQUENT ENCOUNTER: ICD-10-CM

## 2025-07-15 DIAGNOSIS — S83.271D COMPLEX TEAR OF LATERAL MENISCUS OF RIGHT KNEE AS CURRENT INJURY, SUBSEQUENT ENCOUNTER: ICD-10-CM

## 2025-07-15 DIAGNOSIS — S83.231D COMPLEX TEAR OF MEDIAL MENISCUS OF RIGHT KNEE AS CURRENT INJURY, SUBSEQUENT ENCOUNTER: ICD-10-CM

## 2025-07-15 PROCEDURE — 97110 THERAPEUTIC EXERCISES: CPT | Mod: GP

## 2025-07-15 NOTE — PROGRESS NOTES
PHYSICAL THERAPY TREATMENT NOTE    Patient Name:  John Chandler   Patient MRN: [id number]  Date: 07/15/25  Time Calculation  Start Time: 0700  Stop Time: 0739  Time Calculation (min): 39 min      Problem List Items Addressed This Visit           ICD-10-CM    Current tear of semilunar cartilage S83.209A    Complex tear of lateral meniscus of right knee as current injury S83.271A    Rupture of anterior cruciate ligament of right knee S83.511A         Insurance:  Visit number: 14 of 20  Insurance Type: Payor: ANTHEM / Plan: ANTHEM HMP / Product Type: *No Product type* / 20V PCY 0 USED NEEDS AUTH CARELON PAYS AT 80% AFTER DED 3500.00 2935.59 APPLIED OOP 6750.00 3221.26 APPLIED   Authorization required: yes  Authorized visits: 9 +4 +8  Authorized date range: 3/26-5/24/25*NO E-STIM*NO VASO** EXT 5/26-6/24/25 EXT 6/30-8/28/25*  General:  Reason for visit: S/P R ACL repair with HS tendon autograft with medial meniscus menisectomy (4/14/25)  Referred by: Mandeep Campo MD  Next MD appt:    Goals updated 5/20, 6/24/25    Precautions:   S/P R ACL repair with HS tendon autograft with medial meniscus menisectomy (4/14/25) (12 weeks)    PMH: Autism, Epilepsy, anxiety, ADHD  Fall Risk: Low    Subjective:    Patient reports he has been working on his TM jogging intervals since last visit without any issues. Denies any significant or concerning events since last visit.    Pain (0-10): 0    HEP adherence / understanding: patient reports compliance with the instructed home exercises.    Assessment:   Patient demonstrating and reporting good progression towards established goals and improved functional capacity.   Progress towards functional goals: Good tolerance to jogging progression. Improving quad strength. Good tolerance to plyometrics.   Education: Reviewed home exercise program. Home exercises instructed.  "Patient required max cueing including verbal cueing and visual demonstration for correct performance of today's interventions. Patient displayed fair retention of cueing.     Response to interventions:  Patient tolerated treatment session well. Tolerated progression in jogging intervals well without any c/o soreness. Good tolerance to plyometrics without any discomfort, but did require some cueing for improved incorporation of hip musculature and reduced stress on knees. Tolerated heavy strengthening with no c/o pain, but there was an audible clicking in the patient's knee, but he states this was not painful. Reported appropriate fatigue by end of session.   Justification for continued skilled care: To address remaining functional, objective and subjective deficits to allow the patient to return to full independence with ADLs.    Plan:  Continue with Jogging progression on TM. Continue with plyometrics and heavy quad strengthening.    Objective:        R knee AROM  +1-143 degrees       Quad MVIC 25 (90 degrees)  Quad R L         Trial 1 61.8 81.9         Trial 2 62.9 85.2         Trial 3 69.8 96.3         Av.8 87.8  R L ratio 0.74  L R ratio 1.35         Treatment Performed: (\"NP\" = Not Performed)   *NO E-STIM*NO VASO**  Access Code: [email or web address]    Therapeutic Exercise:     39 minutes  Dynamic warm up butt kicks and walking lunges and side shuffles  TM W/J intervals   1 min walk (2.5 mph)/2 min jog (4.5 mph) x4  16 in box jump x10  Depth drops 16 in box x10  Forward bounds  x10  Lateral ski jumps from cone to cone x10 (approx 3 feet)  Single leg knee extensions 4x5 45-55# lb each  Split squats 3x10 (15# DB each hand)    Not Performed This Session  Step up box jumps 8in step 2x10  Single leg shuttle press 4x6 112#  Single leg HSC machine 55# 2x10 each  Lateral tap downs 6in step U UE support 2x10  Fwd tap downs 8in step x10; lateral tap downs 8in step 2x10  NMES 90 deg knee extension isos intensity 81 - 10 " minutes  Calf raises at shuttle 3x10 (50#; 62#)      Manual Therapy:       minutes      Neuromuscular Re-education:      minutes      Gait Training:            minutes      Aquatics:            minutes      Therapeutic Activity:      minutes      Gait Training:            minutes      Modalities:       Vasopneumatic Device       minutes  Electrical Stimulation          minutes  Ultrasound            minutes  Iontophoresis                     minutes  Cold Pack            minutes  Mechanical Traction           minutes    Self Care Home Management:    minutes    Canalith Reposition:          minutes     Education:          minutes    Other:       minutes      Evaluation Complexity: Low:   minutes; Moderate   minutes; Complex   minutes    Re-Evaluation:   minutes

## 2025-07-22 ENCOUNTER — TREATMENT (OUTPATIENT)
Dept: PHYSICAL THERAPY | Facility: CLINIC | Age: 25
End: 2025-07-22
Payer: COMMERCIAL

## 2025-07-22 DIAGNOSIS — S83.271D COMPLEX TEAR OF LATERAL MENISCUS OF RIGHT KNEE AS CURRENT INJURY, SUBSEQUENT ENCOUNTER: ICD-10-CM

## 2025-07-22 DIAGNOSIS — S83.231D COMPLEX TEAR OF MEDIAL MENISCUS OF RIGHT KNEE AS CURRENT INJURY, SUBSEQUENT ENCOUNTER: ICD-10-CM

## 2025-07-22 DIAGNOSIS — S83.511D RUPTURE OF ANTERIOR CRUCIATE LIGAMENT OF RIGHT KNEE, SUBSEQUENT ENCOUNTER: ICD-10-CM

## 2025-07-22 PROCEDURE — 97110 THERAPEUTIC EXERCISES: CPT | Mod: GP

## 2025-07-22 NOTE — PROGRESS NOTES
PHYSICAL THERAPY TREATMENT NOTE    Patient Name:  John Chandler   Patient MRN: [id number]  Date: 07/22/25  Time Calculation  Start Time: 0659  Stop Time: 0738  Time Calculation (min): 39 min      Problem List Items Addressed This Visit           ICD-10-CM    Current tear of semilunar cartilage S83.209A    Complex tear of lateral meniscus of right knee as current injury S83.271A    Rupture of anterior cruciate ligament of right knee S83.511A           Insurance:  Visit number: 15 of 20  Insurance Type: Payor: ANTHEM / Plan: ANTHEM HMP / Product Type: *No Product type* / 20V PCY 0 USED NEEDS AUTH CARELON PAYS AT 80% AFTER DED 3500.00 2935.59 APPLIED OOP 6750.00 3221.26 APPLIED   Authorization required: yes  Authorized visits: 9 +4 +8  Authorized date range: 3/26-5/24/25*NO E-STIM*NO VASO** EXT 5/26-6/24/25 EXT 6/30-8/28/25*  General:  Reason for visit: S/P R ACL repair with HS tendon autograft with medial meniscus menisectomy (4/14/25)  Referred by: Mandeep Campo MD  Next MD appt:    Goals updated 5/20, 6/24/25    Precautions:   S/P R ACL repair with HS tendon autograft with medial meniscus menisectomy (4/14/25) (14 weeks)    PMH: Autism, Epilepsy, anxiety, ADHD  Fall Risk: Low    Subjective:    Patient reports he has been doing is TM jogging intervals and is using the knee extension machine at the gym. Denies any significant or concerning events since last visit.    Pain (0-10): 0    HEP adherence / understanding: patient reports compliance with the instructed home exercises.    Assessment:   Patient demonstrating and reporting good progression towards established goals and improved functional capacity.   Progress towards functional goals: Good tolerance to low/moderate level plyometrics and jogging intervals.   Education: Reviewed home exercise program. Home exercises instructed. Patient required  "moderate cueing including verbal cueing and visual demonstration for correct performance of today's interventions. Patient displayed fair retention of cueing with patient demonstrating improved performance with performed repetitions but requiring some repetition of cues from therapist.     Response to interventions:  Patient tolerated treatment session well. Denied any pain or soreness in knee with plyometrics, jogging, or heavy strengthening exercises. Required occasional repetition of \"sit into it\" for landing mechanics to increase hip musculature engagement. Patient verbalized and demonstrated appropriate muscular fatigue at end of session.   Justification for continued skilled care: To address remaining functional, objective and subjective deficits to allow the patient to return to full independence with ADLs.    Plan:  Quad MVIC testing NV. Continue with jogging intervals, low to moderate plyometrics, and heavy quad strengthening.    Objective:     R knee AROM  +1-143 degrees       Quad MVIC 25 (90 degrees)  Quad R L         Trial 1 61.8 81.9         Trial 2 62.9 85.2         Trial 3 69.8 96.3         Av.8 87.8  R L ratio 0.74  L R ratio 1.35         Treatment Performed: (\"NP\" = Not Performed)   *NO E-STIM*NO VASO**  Access Code: [email or web address]    Therapeutic Exercise:     39 minutes  Dynamic warm up butt kicks and walking lunges and side shuffles  16 in box jump followed by depth jump 2x5  Forward bounds  2x 6 meters  Single leg knee extensions 5x5 45# lb each  Cable column lunge with push back 2x5 each leg 20# 5 sec holds  TM W/J intervals   2 min walk (2.5 mph)/3 min jog (4.5 mph) x2  Single leg shuttle press 3x5 118#    Not Performed This Session  Lateral ski jumps from cone to cone x10 (approx 3 feet)  Split squats 3x10 (15# DB each hand)  Step up box jumps 8in step 2x10  Single leg HSC machine 55# 2x10 each  Lateral tap downs 6in step U UE support 2x10  Fwd tap downs 8in step x10; lateral tap downs " 8in step 2x10  NMES 90 deg knee extension isos intensity 81 - 10 minutes  Calf raises at shuttle 3x10 (50#; 62#)      Manual Therapy:       minutes      Neuromuscular Re-education:      minutes      Gait Training:            minutes      Aquatics:            minutes      Therapeutic Activity:      minutes      Gait Training:            minutes      Modalities:       Vasopneumatic Device       minutes  Electrical Stimulation          minutes  Ultrasound            minutes  Iontophoresis                     minutes  Cold Pack            minutes  Mechanical Traction           minutes    Self Care Home Management:    minutes    Canalith Reposition:          minutes     Education:          minutes    Other:       minutes      Evaluation Complexity: Low:   minutes; Moderate   minutes; Complex   minutes    Re-Evaluation:   minutes

## 2025-07-29 ENCOUNTER — TREATMENT (OUTPATIENT)
Dept: PHYSICAL THERAPY | Facility: CLINIC | Age: 25
End: 2025-07-29
Payer: COMMERCIAL

## 2025-07-29 DIAGNOSIS — S83.271D COMPLEX TEAR OF LATERAL MENISCUS OF RIGHT KNEE AS CURRENT INJURY, SUBSEQUENT ENCOUNTER: ICD-10-CM

## 2025-07-29 DIAGNOSIS — S83.511D RUPTURE OF ANTERIOR CRUCIATE LIGAMENT OF RIGHT KNEE, SUBSEQUENT ENCOUNTER: ICD-10-CM

## 2025-07-29 DIAGNOSIS — S83.231D COMPLEX TEAR OF MEDIAL MENISCUS OF RIGHT KNEE AS CURRENT INJURY, SUBSEQUENT ENCOUNTER: ICD-10-CM

## 2025-07-29 PROCEDURE — 97110 THERAPEUTIC EXERCISES: CPT | Mod: GP

## 2025-07-29 NOTE — PROGRESS NOTES
PHYSICAL THERAPY TREATMENT NOTE    Patient Name:  John Chandler   Patient MRN: [id number]  Date: 07/29/25  Time Calculation  Start Time: 0658  Stop Time: 0741  Time Calculation (min): 43 min      Problem List Items Addressed This Visit           ICD-10-CM    Current tear of semilunar cartilage S83.209A    Complex tear of lateral meniscus of right knee as current injury S83.271A    Rupture of anterior cruciate ligament of right knee S83.511A         Insurance:  Visit number: 16 of 20  Insurance Type: Payor: ANTHEM / Plan: ANTHEM HMP / Product Type: *No Product type* / 20V PCY 0 USED NEEDS AUTH [employer] PAYS AT 80% AFTER DED 3500.00 2935.59 APPLIED OOP 6750.00 3221.26 APPLIED   Authorization required: yes  Authorized visits: 9 +4 +8  Authorized date range: 3/26-5/24/25*NO E-STIM*NO VASO** EXT 5/26-6/24/25 EXT 6/30-8/28/25*  General:  Reason for visit: S/P R ACL repair with HS tendon autograft with medial meniscus menisectomy (4/14/25)  Referred by: Mandeep Campo MD  Next MD appt:    Goals updated 5/20, 6/24/25    Precautions:   S/P R ACL repair with HS tendon autograft with medial meniscus menisectomy (4/14/25) (15 weeks)    PMH: Autism, Epilepsy, anxiety, ADHD  Fall Risk: Low    Subjective:    Patient denies any significant or concerning events since last visit. Reports he has been working out outside of therapy and has had no issues with jogging intervals on TM.    Pain (0-10): 0    HEP adherence / understanding: patient reports compliance with the instructed home exercises.    Assessment:   Patient demonstrating and reporting good progression towards established goals and improved functional capacity.   Progress towards functional goals: Improving quad strength. Good tolerance to sport related activities  Education: Updated and reviewed written HEP. Patient required moderate cueing including  "verbal cueing and visual demonstration for correct performance of today's interventions. Patient displayed good retention of cueing with patient demonstrating improved performance with performed repetitions without repetition of cues from therapist. '  Response to interventions:  Patient tolerated treatment session well. Upon assessment of quad MVIC, patient demonstrated significant improvement in strength output and > 90% LSI for today and compared to sessions from last time. Patient tolerates all plyometrics and jogging progressions well with no c/o pain in the knee. Patient also demonstrating improved control with progressing resistance for strengthening exercises. Reported appropriate muscular fatigue by end of session.   Justification for continued skilled care: To address remaining functional, objective and subjective deficits to allow the patient to return to full independence with ADLs.    Plan:  Continue with heavy leg strengthening. Incorporate higher level/more demanding plyometrics.    Objective:       Quad MVIC 25 (90 degrees)  Quad R L         Trial 1 77 81.6         Trial 2 80.7 87.7         Trial 3 87.3 79.8         Av.7 83.0  R L ratio 0.98  L R ratio 1.02       R knee AROM  +1-143 degrees       Quad MVIC 25 (90 degrees)  Quad R L         Trial 1 61.8 81.9         Trial 2 62.9 85.2         Trial 3 69.8 96.3         Av.8 87.8  R L ratio 0.74  L R ratio 1.35         Treatment Performed: (\"NP\" = Not Performed)   *NO E-STIM*NO VASO**  Access Code: [email or web address]    Therapeutic Exercise:     43 minutes  Dynamic warm up butt kicks and walking lunges and side shuffles  16 in box jump followed by depth jump 2x5  Forward bounds  4x 6 meters  Quad MVIC testing  Single leg knee extensions 3x5 45# lb each; (4th set of eccentrics R LE only)  Split squats 3x6 (20# DB each hand)  TM W/J intervals   1 min walk (2.5 mph)/4 min jog (4.5 mph) x2      Not Performed This Session  Cable column lunge with " push back 2x5 each leg 20# 5 sec holds  Single leg shuttle press 3x5 118#  Lateral ski jumps from cone to cone x10 (approx 3 feet)  Step up box jumps 8in step 2x10  Single leg HSC machine 55# 2x10 each  Lateral tap downs 6in step U UE support 2x10  Fwd tap downs 8in step x10; lateral tap downs 8in step 2x10  NMES 90 deg knee extension isos intensity 81 - 10 minutes  Calf raises at shuttle 3x10 (50#; 62#)      Manual Therapy:       minutes      Neuromuscular Re-education:      minutes      Gait Training:            minutes      Aquatics:            minutes      Therapeutic Activity:      minutes      Gait Training:            minutes      Modalities:       Vasopneumatic Device       minutes  Electrical Stimulation          minutes  Ultrasound            minutes  Iontophoresis                     minutes  Cold Pack            minutes  Mechanical Traction           minutes    Self Care Home Management:    minutes    Canalith Reposition:          minutes     Education:          minutes    Other:       minutes      Evaluation Complexity: Low:   minutes; Moderate   minutes; Complex   minutes    Re-Evaluation:   minutes

## 2025-08-27 DIAGNOSIS — F90.2 ATTENTION DEFICIT HYPERACTIVITY DISORDER (ADHD), COMBINED TYPE: ICD-10-CM

## 2025-08-27 RX ORDER — METHYLPHENIDATE HYDROCHLORIDE 36 MG/1
72 TABLET ORAL DAILY
Qty: 60 TABLET | Refills: 0 | Status: SHIPPED | OUTPATIENT
Start: 2025-09-24 | End: 2025-10-24

## 2025-08-27 RX ORDER — METHYLPHENIDATE HYDROCHLORIDE 36 MG/1
72 TABLET ORAL DAILY
Qty: 60 TABLET | Refills: 0 | Status: SHIPPED | OUTPATIENT
Start: 2025-10-22 | End: 2025-11-21

## 2025-08-27 RX ORDER — METHYLPHENIDATE HYDROCHLORIDE 36 MG/1
72 TABLET ORAL DAILY
Qty: 60 TABLET | Refills: 0 | Status: SHIPPED | OUTPATIENT
Start: 2025-08-27 | End: 2025-09-26

## 2025-09-02 ENCOUNTER — OFFICE VISIT (OUTPATIENT)
Dept: ORTHOPEDIC SURGERY | Facility: CLINIC | Age: 25
End: 2025-09-02
Payer: MEDICARE

## 2025-09-02 VITALS — HEIGHT: 74 IN | WEIGHT: 200 LBS | BODY MASS INDEX: 25.67 KG/M2

## 2025-09-02 DIAGNOSIS — M25.561 ACUTE PAIN OF RIGHT KNEE: Primary | ICD-10-CM

## 2025-09-02 PROCEDURE — 1036F TOBACCO NON-USER: CPT | Performed by: ORTHOPAEDIC SURGERY

## 2025-09-02 PROCEDURE — 99213 OFFICE O/P EST LOW 20 MIN: CPT | Performed by: ORTHOPAEDIC SURGERY

## 2025-09-02 PROCEDURE — 99212 OFFICE O/P EST SF 10 MIN: CPT | Performed by: ORTHOPAEDIC SURGERY

## 2025-09-02 PROCEDURE — 3008F BODY MASS INDEX DOCD: CPT | Performed by: ORTHOPAEDIC SURGERY

## 2026-07-01 ENCOUNTER — APPOINTMENT (OUTPATIENT)
Dept: PEDIATRIC NEUROLOGY | Facility: CLINIC | Age: 26
End: 2026-07-01
Payer: COMMERCIAL

## (undated) DEVICE — APPLICATOR, CHLORAPREP, W/ORANGE TINT, 26ML

## (undated) DEVICE — SYRINGE, 30 CC, LUER LOCK

## (undated) DEVICE — SUTURE, FIBERLOOP, P2, BLUE, STRAIGHT

## (undated) DEVICE — SUTURE, TIGERWIRE 2

## (undated) DEVICE — CUFF, TOURNIQUET, 30 X 4, SNGL PORT/SNGL BLADDER, DISP, LF

## (undated) DEVICE — DRAPE, SHEET, U, W/ADHESIVE STRIP, IMPERVIOUS, 60 X 70 IN, DISPOSABLE, LF, STERILE

## (undated) DEVICE — DRESSING, JUMPSTART, SINGLE LAYER, 1.5 X 10

## (undated) DEVICE — DRAPE, U-DRAPE, NON STERILE

## (undated) DEVICE — Device

## (undated) DEVICE — TUBING, PATIENT 8FT STERILE

## (undated) DEVICE — SLEEVE, VASO PRESS, CALF GARMENT, MEDIUM, GREEN

## (undated) DEVICE — BLADE, STRYKER, 5.5MM RESECTOR, F-SERIES

## (undated) DEVICE — BASIN, EMESIS, STANDARD, STERILE

## (undated) DEVICE — VESSEL LOOP, WHITE SUPERMAXI, 1.3X559MM

## (undated) DEVICE — BAG, BANDED, 36IN X 28IN

## (undated) DEVICE — PLUG, BONE TUNNEL, CANNULATED, 7-12 MM, STERILE

## (undated) DEVICE — COVER, TABLE, 54X90

## (undated) DEVICE — DRAPE, SHEET, VI, W/BETADINE

## (undated) DEVICE — PADDING, UNDERCAST, WEBRIL, 6 IN X 4 YD, REG, NS

## (undated) DEVICE — GUIDE PIN KIT, ACL W/O SAW BLADE

## (undated) DEVICE — ADAPTER, Y TUBING STERILE

## (undated) DEVICE — STRIP, SKIN CLOSURE, STERI STRIP, REINFORCED, 0.5 X 4 IN

## (undated) DEVICE — DRAPE, SHEET, THREE QUARTER, FAN FOLD, 57 X 77 IN

## (undated) DEVICE — WOUND SYSTEM, DEBRIDEMENT & CLEANING, O.R DUOPAK

## (undated) DEVICE — SUTURE, TIGERLOOP P2, W/STRAIGHT NEEDLE

## (undated) DEVICE — GOWN, SURGICAL, IMPLT, BACK, XLARGE, XLONG, STERILE

## (undated) DEVICE — TOWEL PACK, STERILE, 4/PACK, BLUE

## (undated) DEVICE — SUTURE, FIBERWIRE 2, 2 STRAND, 38 IN

## (undated) DEVICE — DRAPE, INSTRUMENT, W/POUCH, STERI DRAPE, 7 X 11 IN, DISPOSABLE, STERILE

## (undated) DEVICE — SYRINGE, 60 CC, IRRIGATION, BULB, CONTRO-BULB, PAPER POUCH

## (undated) DEVICE — NEEDLE, HYPODERMIC, SAFETY, GLIDE, 18G X 1.5"

## (undated) DEVICE — MARKER, SKIN, REGULAR TIP, W/FLEXI-RULER

## (undated) DEVICE — BANDAGE, ELASTIC, ACE, ACE, DOUBLE LENGTH, 6 X 550 IN, LF

## (undated) DEVICE — TUBING, PUMP REDEUCE 8FT STERILE

## (undated) DEVICE — DRAPE, TIBURON, SPLIT SHEET, REINF ADH STRIP, 77X108

## (undated) DEVICE — ELECTRODE, ELECTROSURGICAL, PENCIL, HAND CONTROL, BLADE, 10 FT CORD, LF

## (undated) DEVICE — PADDING, CAST, WYTEX, 4 IN X 4 YD, LF

## (undated) DEVICE — BLADE, STRYKER, 4.0MM, RESECTOR, F-SERIES

## (undated) DEVICE — COVER, MAYO STAND, 23-1/2 X 56, LF

## (undated) DEVICE — NEEDLE, HYPO, 22G X 1 1/2IN, ECLIP, LF

## (undated) DEVICE — DRESSING, ABDOMINAL, TENDERSORB, 8 X 7-1/2 IN, STERILE

## (undated) DEVICE — PROBE, APOLLO RF, 50 DEG, MULTI PORT

## (undated) DEVICE — HANDLE, PH, FOR YANKAUER SUCTION DEVICE